# Patient Record
Sex: FEMALE | Race: WHITE | Employment: FULL TIME | URBAN - METROPOLITAN AREA
[De-identification: names, ages, dates, MRNs, and addresses within clinical notes are randomized per-mention and may not be internally consistent; named-entity substitution may affect disease eponyms.]

---

## 2017-09-18 ENCOUNTER — OFFICE VISIT (OUTPATIENT)
Dept: FAMILY MEDICINE CLINIC | Age: 42
End: 2017-09-18

## 2017-09-18 VITALS
TEMPERATURE: 98.5 F | OXYGEN SATURATION: 98 % | DIASTOLIC BLOOD PRESSURE: 70 MMHG | SYSTOLIC BLOOD PRESSURE: 115 MMHG | HEART RATE: 91 BPM

## 2017-09-18 DIAGNOSIS — R42 VERTIGO: Primary | ICD-10-CM

## 2017-09-18 PROCEDURE — 99214 OFFICE O/P EST MOD 30 MIN: CPT | Performed by: NURSE PRACTITIONER

## 2017-09-18 RX ORDER — MECLIZINE HYDROCHLORIDE 25 MG/1
50 TABLET ORAL 2 TIMES DAILY
Qty: 40 TABLET | Refills: 0 | Status: SHIPPED | OUTPATIENT
Start: 2017-09-18 | End: 2020-07-28

## 2017-09-18 RX ORDER — ONDANSETRON 4 MG/1
4 TABLET, ORALLY DISINTEGRATING ORAL EVERY 8 HOURS PRN
Qty: 20 TABLET | Refills: 0 | Status: SHIPPED | OUTPATIENT
Start: 2017-09-18 | End: 2020-07-28

## 2017-09-18 ASSESSMENT — ENCOUNTER SYMPTOMS
VOMITING: 1
SORE THROAT: 0
NAUSEA: 1
RESPIRATORY NEGATIVE: 1

## 2017-09-19 ENCOUNTER — HOSPITAL ENCOUNTER (OUTPATIENT)
Dept: PHYSICAL THERAPY | Age: 42
Discharge: HOME | End: 2017-09-30
Admitting: NURSE PRACTITIONER

## 2017-09-19 NOTE — FLOWSHEET NOTE
Physical Therapy Daily Treatment Note    Date:  2017    Patient Name:  Benji Guerrero    :  1975  MRN: [id number]  Restrictions/Precautions:    Medical/Treatment Diagnosis Information:  · Diagnosis: vertigo  Insurance/Certification information:  PT Insurance Information: [facility]  Physician Information:  Referring Practitioner: Álvaro Arroyo NP  Plan of care signed (Y/N):  N  Visit# / total visits:       G-Code (if applicable):         PT G-Codes  Functional Assessment Tool Used: DHI  Score: 84 = severe handicap  Functional Limitation: Mobility: Walking and moving around  Mobility: Walking and Moving Around Current Status (): At least 80 percent but less than 100 percent impaired, limited or restricted  Mobility: Walking and Moving Around Goal Status ():  At least 1 percent but less than 20 percent impaired, limited or restricted    Medicare Cap (if applicable):  n/a = total amount used, updated 2017    Time in:   9:30      Timed Treatment: 30 Total Treatment Time:  60  ________________________________________________________________________________________    Pain Level:    /10  SUBJECTIVE:  See initial eval    OBJECTIVE:     Exercise/Equipment Resistance/Repetitions Other comments          VOR x1 viewing - close object   Horizontal x60\"  Vertical x60\" HEP   VOR x1 viewing - far object Horizontal x60\"  Vertical x60\" HEP   COR Horizontal x60\"  Vertical x60\" HEP   Remembered targets Horizontal x60\"  Vertical x60\" HEP                                                                                                          Other Therapeutic Activities:  Education regarding vestibular neuritis/labyrinthitis, signs, symptoms, prognosis, duration and type of treatment; total neuro re-ed    Manual Treatments:         Modalities:      Test/Measurements:  See initial eval       ASSESSMENT:    See initial eval     Treatment/Activity Tolerance:   [x] Patient tolerated treatment well [] Patient

## 2017-09-19 NOTE — PROGRESS NOTES
Physical Therapy Vestibular Rehabilitation Evaluation    Date:  2017    Patient Name:  Robinson Alvarado    :  1975  MRN: [id number]  Restrictions/Precautions:    Medical/Treatment Diagnosis Information:  Diagnosis: vertigo  Insurance/Certification information:  PT Insurance Information: [facility]  Physician Information:  Referring Practitioner: Yamilka Fischer NP    Time in:   9:30      Timed Treatment: 30 Total Treatment Time:  60  ______________________________________________________________________    SUBJECTIVE:      Referral Date: 17  Onset Date: 9/15/17  Chief Complaint: constant vertigo, room spinning, worse with body motion and stimulating environments causing extreme fatigue and off balance symptoms. Currently unable to drive, walk in [address] [facility], complete household tasks for family (3 children ages 5, 6, 15), work. Setting in which symptoms first occurred: sudden onset Friday evening, room spinning nausea and constant vomiting for approximately 36 hours. Spinning continues but nausea has lessened and vomiting has stopped. Description of symptoms: [x] Veritgo []  Off-balance [] Lightheadedness  Symptoms are getting:  [x] Better [] Worse  [] Same [] Episodic  Description of Spells:  [x] Constant [x] Spontaneous [] Induced by motion [] Induced by position changes [] Other:  Length of time spells occur: [] Seconds [] Minutes  [] Hours [x] Days [] Other:   What increases symptoms? See above  What decreases symptoms?  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

## 2017-11-01 ENCOUNTER — HOSPITAL ENCOUNTER (OUTPATIENT)
Dept: OTHER | Age: 42
Discharge: HOME | End: 2017-11-16
Attending: NURSE PRACTITIONER | Admitting: NURSE PRACTITIONER

## 2020-07-15 ENCOUNTER — OFFICE VISIT (OUTPATIENT)
Dept: PRIMARY CARE CLINIC | Age: 45
End: 2020-07-15
Payer: COMMERCIAL

## 2020-07-15 PROCEDURE — 99211 OFF/OP EST MAY X REQ PHY/QHP: CPT | Performed by: NURSE PRACTITIONER

## 2020-07-15 PROCEDURE — G8421 BMI NOT CALCULATED: HCPCS | Performed by: NURSE PRACTITIONER

## 2020-07-15 PROCEDURE — G8428 CUR MEDS NOT DOCUMENT: HCPCS | Performed by: NURSE PRACTITIONER

## 2020-07-15 NOTE — PROGRESS NOTES
Logan Fair received a viral test for COVID-19. They were educated on isolation and quarantine as appropriate. For any symptoms, they were directed to seek care from their PCP, given contact information to establish with a doctor, directed to an urgent care or the emergency room.

## 2020-07-21 LAB
SARS-COV-2: NOT DETECTED
SOURCE: NORMAL

## 2020-07-21 NOTE — RESULT ENCOUNTER NOTE
Your test for COVID-19, also known as novel coronavirus, came back negative. No virus was detected from the sample collected. Testing is not 100%. Until your symptoms are fully resolved, you may still be contagious. We recommend that you remain isolated for 7 days minimum or 72 hours after your symptoms have completely resolved, whichever is longer. If you were exposed to a known positive COVID-19 patient, then you must remain isolated for 14 days. If you were tested for a pre-op, then you remain in isolated until your procedure. Continually monitor symptoms. Contact a medical provider if symptoms are worsening. If you have any additional questions, contact your PCP.     For additional information, please visit the Centers for Disease Control and Prevention [email or web address]
abscess

## 2020-07-27 ENCOUNTER — NURSE TRIAGE (OUTPATIENT)
Dept: OTHER | Facility: CLINIC | Age: 45
End: 2020-07-27

## 2020-07-28 ENCOUNTER — TELEMEDICINE (OUTPATIENT)
Dept: INTERNAL MEDICINE CLINIC | Age: 45
End: 2020-07-28
Payer: COMMERCIAL

## 2020-07-28 ENCOUNTER — TELEPHONE (OUTPATIENT)
Dept: INTERNAL MEDICINE CLINIC | Age: 45
End: 2020-07-28

## 2020-07-28 PROCEDURE — G8427 DOCREV CUR MEDS BY ELIG CLIN: HCPCS | Performed by: INTERNAL MEDICINE

## 2020-07-28 PROCEDURE — 99386 PREV VISIT NEW AGE 40-64: CPT | Performed by: INTERNAL MEDICINE

## 2020-07-28 PROCEDURE — 1036F TOBACCO NON-USER: CPT | Performed by: INTERNAL MEDICINE

## 2020-07-28 PROCEDURE — G8421 BMI NOT CALCULATED: HCPCS | Performed by: INTERNAL MEDICINE

## 2020-07-28 PROCEDURE — 99203 OFFICE O/P NEW LOW 30 MIN: CPT | Performed by: INTERNAL MEDICINE

## 2020-07-28 RX ORDER — FEXOFENADINE HCL 180 MG/1
180 TABLET ORAL DAILY
COMMUNITY

## 2020-07-28 RX ORDER — BENZONATATE 100 MG/1
100 CAPSULE ORAL 3 TIMES DAILY PRN
COMMUNITY

## 2020-07-28 RX ORDER — IBUPROFEN 200 MG
200 TABLET ORAL EVERY 6 HOURS PRN
COMMUNITY

## 2020-07-28 RX ORDER — AZITHROMYCIN 250 MG/1
250 TABLET, FILM COATED ORAL SEE ADMIN INSTRUCTIONS
Qty: 6 TABLET | Refills: 0 | Status: SHIPPED | OUTPATIENT
Start: 2020-07-28 | End: 2020-08-02

## 2020-07-28 ASSESSMENT — PATIENT HEALTH QUESTIONNAIRE - PHQ9
SUM OF ALL RESPONSES TO PHQ9 QUESTIONS 1 & 2: 0
SUM OF ALL RESPONSES TO PHQ QUESTIONS 1-9: 0
SUM OF ALL RESPONSES TO PHQ QUESTIONS 1-9: 0
1. LITTLE INTEREST OR PLEASURE IN DOING THINGS: 0
2. FEELING DOWN, DEPRESSED OR HOPELESS: 0

## 2020-07-28 NOTE — TELEPHONE ENCOUNTER
----- Message from Susan Medrano sent at 7/27/2020  5:38 PM EDT -----  Subject: Message to Provider    QUESTIONS  Information for Provider? NP appointment 8/3 was triaged today and   recommended she be seen within next 2-3 days   sinus pressure   congestion for 2 weeks   pt would like a call back   patient is [facility]) employee. ---------------------------------------------------------------------------  --------------  [place] INFO  What is the best way for the office to contact you? OK to leave message on   voicemail  Preferred Call Back Phone Number? [phone]  ---------------------------------------------------------------------------  --------------  SCRIPT ANSWERS  Relationship to Patient?  Self

## 2020-07-28 NOTE — PROGRESS NOTES
Not on file   Social History Narrative    Not on file       Review of Systems:  A comprehensive review of systems was negative except for what was noted in the HPI. Physical Exam:   There were no vitals filed for this visit. There is no height or weight on file to calculate BMI. Constitutional: She is oriented to person, place, and time. She appears well-developed and well-nourished. No distress. HEENT:   Head: Normocephalic and atraumatic. Right Ear: Tympanic membrane, external ear and ear canal normal.   Left Ear: Tympanic membrane, external ear and ear canal normal.   Mouth/Throat: Oropharynx is clear and moist, and mucous membranes are normal.  There is no cervical adenopathy. Eyes: Conjunctivae and extraocular motions are normal. Pupils are equal, round, and reactive to light. Neck: Supple. No JVD present. Carotid bruit is not present. No mass and no thyromegaly present. Cardiovascular: Normal rate, regular rhythm, normal heart sounds and intact distal pulses. Exam reveals no gallop and no friction rub. No murmur heard. Pulmonary/Chest: Effort normal and breath sounds normal. No respiratory distress. She has no wheezes, rhonchi or rales. Abdominal: Soft, non-tender. Bowel sounds and aorta are normal. She exhibits no organomegaly, mass or bruit. Musculoskeletal: Normal range of motion, no synovitis. She exhibits no edema. Neurological: She is alert and oriented to person, place, and time. She has normal reflexes. No cranial nerve deficit. Coordination normal.   Skin: Skin is warm and dry. There is no rash or erythema. No suspicious lesions noted. Psychiatric: She has a normal mood and affect.  Her speech is normal and behavior is normal. Judgment, cognition and memory are normal.       Preventive Care:  Health Maintenance   Topic Date Due    HIV screen  12/30/1991    DTaP/Tdap/Td vaccine (1 - Tdap) 12/30/1995    Cervical cancer screen  12/30/1997    Lipid screen  12/30/2016    Future    Need for tetanus, diphtheria, and acellular pertussis (Tdap) vaccine in patient of adolescent age or older  -     Tdap (age 6y and older) IM ([address]); Future    Sinusitis, bacterial  -     azithromycin (ZITHROMAX) 250 MG tablet; Take 1 tablet by mouth See Admin Instructions for 5 days 500mg on day 1 followed by 250mg on days 2 - 5        Return July 28 at 7:30 AM Fasting Physical, for  Tdap and labs next Monday 8/3 at 7:45.

## 2020-07-28 NOTE — PATIENT INSTRUCTIONS
Preventive plan of care for Elie Rogers        7/28/2020           Preventive Measures Status       Recommendations for screening                   Diabetes Screen  No results found for: GLUCOSE Test recommended and ordered   Cholesterol Screen  No results found for: CHOL, TRIG, HDL, LDLCALC, LDLDIRECT Test recommended and ordered       Weight: There is no height or weight on file to calculate BMI.                 Recommended Immunizations    Immunization History   Administered Date(s) Administered    Influenza Virus Vaccine 10/24/2019        Influenza vaccine:  recommended every fall  Tetanus vaccine:  tetanus and diptheria vaccine (Td) due now, but will be administered MOnday         Other Recommendations ·   See a dentist every 6 months  · Try to get at least 30 minutes of exercise 3-5 days per week  · Always wear a seat belt when traveling in a car  · Always wear a helmet when riding a bicycle or motorcycle  · When exposed to the sun, use a sunscreen that protects against both UVA and UVB radiation with an SPF of 30 or greater- reapply every 2 to 3 hours or after sweating, drying off with a towel, or swimming  · You need 500 mg of calcium and [id number] IU of vitamin D per day- it is possible to meet your calcium requirement with diet alone, but a vitamin D supplement is usually necessary

## 2020-07-28 NOTE — TELEPHONE ENCOUNTER
LVM for patient to return call to the office for scheduling. Can schedule sooner for VV to establish as a new patient IF she has new problem visit I.e. sinus congestion 2 weeks. Asked to call back if having current sinus congestion problems that need treated to do VV sooner than 8/3    Insurance should cover VV for a problem visit, however not all insurance will cover a NEW Patient Virtual Visit if healthy & essentially need a physical, in that case we recommend waiting until can be seen in office.

## 2020-08-03 ENCOUNTER — NURSE ONLY (OUTPATIENT)
Dept: INTERNAL MEDICINE CLINIC | Age: 45
End: 2020-08-03
Payer: COMMERCIAL

## 2020-08-03 LAB
A/G RATIO: 1.5 (ref 1.1–2.2)
ALBUMIN SERPL-MCNC: 4.4 G/DL (ref 3.4–5)
ALP BLD-CCNC: 87 U/L (ref 40–129)
ALT SERPL-CCNC: 22 U/L (ref 10–40)
ANION GAP SERPL CALCULATED.3IONS-SCNC: 14 MMOL/L (ref 3–16)
AST SERPL-CCNC: 16 U/L (ref 15–37)
BASOPHILS ABSOLUTE: 0.1 K/UL (ref 0–0.2)
BASOPHILS RELATIVE PERCENT: 0.7 %
BILIRUB SERPL-MCNC: 0.6 MG/DL (ref 0–1)
BUN BLDV-MCNC: 10 MG/DL (ref 7–20)
CALCIUM SERPL-MCNC: 9.6 MG/DL (ref 8.3–10.6)
CHLORIDE BLD-SCNC: 102 MMOL/L (ref 99–110)
CHOLESTEROL, TOTAL: 173 MG/DL (ref 0–199)
CO2: 24 MMOL/L (ref 21–32)
CREAT SERPL-MCNC: 0.8 MG/DL (ref 0.6–1.1)
EOSINOPHILS ABSOLUTE: 0.3 K/UL (ref 0–0.6)
EOSINOPHILS RELATIVE PERCENT: 3.5 %
GFR AFRICAN AMERICAN: >60
GFR NON-AFRICAN AMERICAN: >60
GLOBULIN: 2.9 G/DL
GLUCOSE BLD-MCNC: 99 MG/DL (ref 70–99)
HCT VFR BLD CALC: 42.1 % (ref 36–48)
HDLC SERPL-MCNC: 43 MG/DL (ref 40–60)
HEMOGLOBIN: 13.9 G/DL (ref 12–16)
LDL CHOLESTEROL CALCULATED: 105 MG/DL
LYMPHOCYTES ABSOLUTE: 1.7 K/UL (ref 1–5.1)
LYMPHOCYTES RELATIVE PERCENT: 20.1 %
MCH RBC QN AUTO: 29.7 PG (ref 26–34)
MCHC RBC AUTO-ENTMCNC: 33 G/DL (ref 31–36)
MCV RBC AUTO: 90 FL (ref 80–100)
MONOCYTES ABSOLUTE: 0.5 K/UL (ref 0–1.3)
MONOCYTES RELATIVE PERCENT: 6.1 %
NEUTROPHILS ABSOLUTE: 5.9 K/UL (ref 1.7–7.7)
NEUTROPHILS RELATIVE PERCENT: 69.6 %
PDW BLD-RTO: 13.8 % (ref 12.4–15.4)
PLATELET # BLD: 320 K/UL (ref 135–450)
PMV BLD AUTO: 7.8 FL (ref 5–10.5)
POTASSIUM SERPL-SCNC: 4.6 MMOL/L (ref 3.5–5.1)
RBC # BLD: 4.68 M/UL (ref 4–5.2)
SODIUM BLD-SCNC: 140 MMOL/L (ref 136–145)
TOTAL PROTEIN: 7.3 G/DL (ref 6.4–8.2)
TRIGL SERPL-MCNC: 125 MG/DL (ref 0–150)
VLDLC SERPL CALC-MCNC: 25 MG/DL
WBC # BLD: 8.4 K/UL (ref 4–11)

## 2020-08-03 PROCEDURE — 90715 TDAP VACCINE 7 YRS/> IM: CPT | Performed by: INTERNAL MEDICINE

## 2020-08-03 PROCEDURE — 90471 IMMUNIZATION ADMIN: CPT | Performed by: INTERNAL MEDICINE

## 2021-04-06 ENCOUNTER — HOSPITAL ENCOUNTER (OUTPATIENT)
Dept: WOMENS IMAGING | Age: 46
Discharge: HOME | End: 2021-04-06
Payer: COMMERCIAL

## 2021-04-06 DIAGNOSIS — Z12.31 ENCOUNTER FOR SCREENING MAMMOGRAM FOR MALIGNANT NEOPLASM OF BREAST: ICD-10-CM

## 2021-04-06 PROCEDURE — 77067 SCR MAMMO BI INCL CAD: CPT

## 2021-04-07 ENCOUNTER — TELEPHONE (OUTPATIENT)
Dept: WOMENS IMAGING | Age: 46
End: 2021-04-07

## 2022-05-29 ENCOUNTER — TELEMEDICINE (OUTPATIENT)
Dept: INTERNAL MEDICINE CLINIC | Age: 47
End: 2022-05-29
Payer: COMMERCIAL
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
## 2022-05-29 DIAGNOSIS — S00.06XA TICK BITE OF SCALP, INITIAL ENCOUNTER: Primary | ICD-10-CM

## 2022-05-29 DIAGNOSIS — Z00.00 ANNUAL PHYSICAL EXAM: ICD-10-CM

## 2022-05-29 DIAGNOSIS — R53.83 OTHER FATIGUE: ICD-10-CM

## 2022-05-29 DIAGNOSIS — M25.50 POLYARTHRALGIA: ICD-10-CM

## 2022-05-29 PROCEDURE — G8427 DOCREV CUR MEDS BY ELIG CLIN: HCPCS | Performed by: INTERNAL MEDICINE

## 2022-05-29 PROCEDURE — 99213 OFFICE O/P EST LOW 20 MIN: CPT | Performed by: INTERNAL MEDICINE

## 2022-05-31 ENCOUNTER — HOSPITAL ENCOUNTER (OUTPATIENT)
Age: 47
Discharge: HOME | End: 2022-05-31
Payer: COMMERCIAL

## 2022-05-31 DIAGNOSIS — S00.06XA TICK BITE OF SCALP, INITIAL ENCOUNTER: ICD-10-CM

## 2022-05-31 DIAGNOSIS — R53.83 OTHER FATIGUE: ICD-10-CM

## 2022-05-31 DIAGNOSIS — M25.50 POLYARTHRALGIA: ICD-10-CM

## 2022-05-31 DIAGNOSIS — Z00.00 ANNUAL PHYSICAL EXAM: ICD-10-CM
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
## 2022-05-31 LAB
A/G RATIO: 1.9 (ref 1.1–2.2)
ALBUMIN SERPL-MCNC: 4.7 G/DL (ref 3.4–5)
ALP BLD-CCNC: 89 U/L (ref 40–129)
ALT SERPL-CCNC: 25 U/L (ref 10–40)
ANION GAP SERPL CALCULATED.3IONS-SCNC: 15 MMOL/L (ref 3–16)
AST SERPL-CCNC: 19 U/L (ref 15–37)
BASOPHILS ABSOLUTE: 0.1 K/UL (ref 0–0.2)
BASOPHILS RELATIVE PERCENT: 1 %
BILIRUB SERPL-MCNC: 0.5 MG/DL (ref 0–1)
BUN BLDV-MCNC: 12 MG/DL (ref 7–20)
CALCIUM SERPL-MCNC: 9.6 MG/DL (ref 8.3–10.6)
CHLORIDE BLD-SCNC: 103 MMOL/L (ref 99–110)
CHOLESTEROL, TOTAL: 156 MG/DL (ref 0–199)
CO2: 21 MMOL/L (ref 21–32)
CREAT SERPL-MCNC: 0.7 MG/DL (ref 0.6–1.1)
EOSINOPHILS ABSOLUTE: 0.2 K/UL (ref 0–0.6)
EOSINOPHILS RELATIVE PERCENT: 2.6 %
GFR AFRICAN AMERICAN: >60
GFR NON-AFRICAN AMERICAN: >60
GLUCOSE BLD-MCNC: 78 MG/DL (ref 70–99)
HCT VFR BLD CALC: 40.8 % (ref 36–48)
HDLC SERPL-MCNC: 48 MG/DL (ref 40–60)
HEMOGLOBIN: 13.8 G/DL (ref 12–16)
LDL CHOLESTEROL CALCULATED: 88 MG/DL
LYMPHOCYTES ABSOLUTE: 1.6 K/UL (ref 1–5.1)
LYMPHOCYTES RELATIVE PERCENT: 20 %
MCH RBC QN AUTO: 29.6 PG (ref 26–34)
MCHC RBC AUTO-ENTMCNC: 33.9 G/DL (ref 31–36)
MCV RBC AUTO: 87.3 FL (ref 80–100)
MONOCYTES ABSOLUTE: 0.4 K/UL (ref 0–1.3)
MONOCYTES RELATIVE PERCENT: 5.6 %
NEUTROPHILS ABSOLUTE: 5.6 K/UL (ref 1.7–7.7)
NEUTROPHILS RELATIVE PERCENT: 70.8 %
PDW BLD-RTO: 13.7 % (ref 12.4–15.4)
PLATELET # BLD: 330 K/UL (ref 135–450)
PMV BLD AUTO: 7.6 FL (ref 5–10.5)
POTASSIUM SERPL-SCNC: 4.6 MMOL/L (ref 3.5–5.1)
RBC # BLD: 4.68 M/UL (ref 4–5.2)
SODIUM BLD-SCNC: 139 MMOL/L (ref 136–145)
TOTAL PROTEIN: 7.2 G/DL (ref 6.4–8.2)
TRIGL SERPL-MCNC: 100 MG/DL (ref 0–150)
VLDLC SERPL CALC-MCNC: 20 MG/DL
WBC # BLD: 7.9 K/UL (ref 4–11)

## 2022-05-31 PROCEDURE — 80053 COMPREHEN METABOLIC PANEL: CPT

## 2022-05-31 PROCEDURE — 85025 COMPLETE CBC W/AUTO DIFF WBC: CPT

## 2022-05-31 PROCEDURE — 80061 LIPID PANEL: CPT

## 2022-05-31 PROCEDURE — 86618 LYME DISEASE ANTIBODY: CPT

## 2022-05-31 PROCEDURE — 36415 COLL VENOUS BLD VENIPUNCTURE: CPT

## 2022-05-31 PROCEDURE — 86803 HEPATITIS C AB TEST: CPT

## 2022-06-01 ENCOUNTER — HOSPITAL ENCOUNTER (OUTPATIENT)
Dept: WOMENS IMAGING | Age: 47
Discharge: HOME | End: 2022-06-01
Payer: COMMERCIAL

## 2022-06-01 ENCOUNTER — OFFICE VISIT (OUTPATIENT)
Dept: INTERNAL MEDICINE CLINIC | Age: 47
End: 2022-06-01
Payer: COMMERCIAL

## 2022-06-01 VITALS
BODY MASS INDEX: 44.2 KG/M2 | DIASTOLIC BLOOD PRESSURE: 68 MMHG | OXYGEN SATURATION: 97 % | HEART RATE: 84 BPM | HEIGHT: 66 IN | SYSTOLIC BLOOD PRESSURE: 110 MMHG | WEIGHT: 275 LBS

## 2022-06-01 DIAGNOSIS — Z12.31 ENCOUNTER FOR SCREENING MAMMOGRAM FOR BREAST CANCER: ICD-10-CM

## 2022-06-01 DIAGNOSIS — Z12.11 SCREENING FOR COLORECTAL CANCER: ICD-10-CM

## 2022-06-01 DIAGNOSIS — Z11.59 ENCOUNTER FOR HEPATITIS C SCREENING TEST FOR LOW RISK PATIENT: ICD-10-CM

## 2022-06-01 DIAGNOSIS — E66.01 MORBID OBESITY WITH BMI OF 40.0-44.9, ADULT (HCC): ICD-10-CM

## 2022-06-01 DIAGNOSIS — S30.860A TICK BITE OF BUTTOCK, INITIAL ENCOUNTER: ICD-10-CM

## 2022-06-01 DIAGNOSIS — Z12.12 SCREENING FOR COLORECTAL CANCER: ICD-10-CM

## 2022-06-01 DIAGNOSIS — Z00.00 ENCOUNTER FOR WELL ADULT EXAM WITHOUT ABNORMAL FINDINGS: Primary | ICD-10-CM
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
## 2022-06-01 LAB
HEPATITIS C ANTIBODY INTERPRETATION: NORMAL
LYME, EIA: 0.33 LIV (ref 0–1.2)

## 2022-06-01 PROCEDURE — 99396 PREV VISIT EST AGE 40-64: CPT | Performed by: INTERNAL MEDICINE

## 2022-06-01 PROCEDURE — 77063 BREAST TOMOSYNTHESIS BI: CPT

## 2022-06-01 RX ORDER — DOXYCYCLINE HYCLATE 100 MG
200 TABLET ORAL DAILY
Qty: 2 TABLET | Refills: 0 | Status: SHIPPED | OUTPATIENT
Start: 2022-06-01 | End: 2022-06-02

## 2022-06-01 SDOH — ECONOMIC STABILITY: FOOD INSECURITY: WITHIN THE PAST 12 MONTHS, THE FOOD YOU BOUGHT JUST DIDN'T LAST AND YOU DIDN'T HAVE MONEY TO GET MORE.: NEVER TRUE

## 2022-06-01 SDOH — ECONOMIC STABILITY: FOOD INSECURITY: WITHIN THE PAST 12 MONTHS, YOU WORRIED THAT YOUR FOOD WOULD RUN OUT BEFORE YOU GOT MONEY TO BUY MORE.: NEVER TRUE

## 2022-06-01 ASSESSMENT — PATIENT HEALTH QUESTIONNAIRE - PHQ9
SUM OF ALL RESPONSES TO PHQ QUESTIONS 1-9: 0
2. FEELING DOWN, DEPRESSED OR HOPELESS: 0
SUM OF ALL RESPONSES TO PHQ QUESTIONS 1-9: 0
SUM OF ALL RESPONSES TO PHQ QUESTIONS 1-9: 0
SUM OF ALL RESPONSES TO PHQ9 QUESTIONS 1 & 2: 0
SUM OF ALL RESPONSES TO PHQ QUESTIONS 1-9: 0
1. LITTLE INTEREST OR PLEASURE IN DOING THINGS: 0

## 2022-06-01 ASSESSMENT — SOCIAL DETERMINANTS OF HEALTH (SDOH): HOW HARD IS IT FOR YOU TO PAY FOR THE VERY BASICS LIKE FOOD, HOUSING, MEDICAL CARE, AND HEATING?: NOT HARD AT ALL

## 2022-06-01 NOTE — PATIENT INSTRUCTIONS

## 2022-06-01 NOTE — PROGRESS NOTES
[facility]  History and Physical  Joshua Graham M.D. Karmen Burkitt  YOB: 1975    Date of Service:  6/1/2022    Chief Complaint:   Karmen Burkitt is a 40 y.o. female who presents for   Chief Complaint   Patient presents with    Annual Exam       Assessment/Plan:    Daisha Honeycutt was seen today for annual exam.    Diagnoses and all orders for this visit:    Encounter for well adult exam without abnormal findings    Screening for colorectal cancer  -     FIT-DNA (Cologuard)    Encounter for hepatitis C screening test for low risk patient  -     Hepatitis C Antibody; Future    Tick bite of buttock, initial encounter  -     doxycycline hyclate (VIBRA-TABS) 100 MG tablet; Take 2 tablets by mouth daily for 1 day    Morbid obesity with BMI of 40.0-44.9, adult (HCC)    Goals:   -Eat small amounts of food 4-5 times daily  -Avoid high fat and high sugar foods  -Include protein with all meals and snacks  -Avoid carbonation and caffeine  -Avoid calorie containing beverages  -Increase physical activity as tolerated    Return June 7 2023 at 8:10 Fasting Physical in 1 year. HPI: Here for Annual Physical and Follow up. She removed another tick yesterday from her left buttocks area. No rash. No joint pain. Just found out her daughter tested positive for Covid 19. Her joint pain has improved so she could have had Covid 19 a few weeks ago.     No results found for: LABA1C, LABMICR  Lab Results   Component Value Date     05/31/2022    K 4.6 05/31/2022     05/31/2022    CO2 21 05/31/2022    BUN 12 05/31/2022    CREATININE 0.7 05/31/2022    GLUCOSE 78 05/31/2022    CALCIUM 9.6 05/31/2022     Lab Results   Component Value Date    CHOL 156 05/31/2022    TRIG 100 05/31/2022    HDL 48 05/31/2022    LDLCALC 88 05/31/2022     Lab Results   Component Value Date    ALT 25 05/31/2022    AST 19 05/31/2022     No results found for: TSH, T4FREE, T3FREE  Lab Results   Component Value Date    WBC 7.9 05/31/2022    HGB 13.8 05/31/2022    HCT 40.8 05/31/2022    MCV 87.3 05/31/2022     05/31/2022     No results found for: INR   No results found for: PSA   No results found for: LABURIC     Wt Readings from Last 3 Encounters:   06/01/22 275 lb (124.7 kg)   11/25/13 178 lb (80.7 kg)   08/21/13 199 lb (90.3 kg)     BP Readings from Last 3 Encounters:   06/01/22 110/68   09/18/17 115/70   11/25/13 111/71       Patient Active Problem List   Diagnosis    Scoliosis       Allergies   Allergen Reactions    Vicodin Hp [Hydrocodone-Acetaminophen] Rash     Outpatient Medications Marked as Taking for the 6/1/22 encounter (Office Visit) with Bonnie Adame MD   Medication Sig Dispense Refill    doxycycline hyclate (VIBRA-TABS) 100 MG tablet Take 2 tablets by mouth daily for 1 day 2 tablet 0       Past Medical History:   Diagnosis Date    Scoliosis      Past Surgical History:   Procedure Laterality Date    APPENDECTOMY      age 25   [address]    Dr. Gilmar Mckeon, chronic strep infx, cervical lad    TONSILLECTOMY  12/14/10     Family History   Problem Relation Age of Onset    Heart Disease Maternal Grandfather         CHF    Atrial Fibrillation Maternal Grandfather     High Blood Pressure Father     High Cholesterol Father     High Cholesterol Mother     Atrial Fibrillation Maternal Grandmother     Parkinsonism Maternal Grandmother     High Cholesterol Paternal Grandmother     Breast Cancer Paternal Grandmother 61    Alcohol Abuse Paternal Grandfather     Cirrhosis Paternal Grandfather      Social History     Socioeconomic History    Marital status:      Spouse name: Not on file    Number of children: Not on file    Years of education: Not on file    Highest education level: Not on file   Occupational History    Not on file   Tobacco Use    Smoking status: Never Smoker    Smokeless tobacco: Never Used   Vaping Use    Vaping Use: Never used   Substance and Sexual Activity    Alcohol use: No    Drug use: No    Sexual activity: Yes   Other Topics Concern    Not on file   Social History Narrative    Not on file     Social Determinants of Health     Financial Resource Strain: Low Risk     Difficulty of Paying Living Expenses: Not hard at all   Food Insecurity: No Food Insecurity    Worried About Running Out of Food in the Last Year: Never true    Mirna of Food in the Last Year: Never true   Transportation Needs:     Lack of Transportation (Medical): Not on file    Lack of Transportation (Non-Medical): Not on file   Physical Activity:     Days of Exercise per Week: Not on file    Minutes of Exercise per Session: Not on file   Stress:     Feeling of Stress : Not on file   Social Connections:     Frequency of Communication with Friends and Family: Not on file    Frequency of Social Gatherings with Friends and Family: Not on file    Attends Scientologist Services: Not on file    Active Member of [address] [place] [address] or Organizations: Not on file    Attends Club or Organization Meetings: Not on file    Marital Status: Not on file   Intimate Partner Violence:     Fear of Current or Ex-Partner: Not on file    Emotionally Abused: Not on file    Physically Abused: Not on file    Sexually Abused: Not on file   Housing Stability:     Unable to Pay for Housing in the Last Year: Not on file    Number of [place] in the Last Year: Not on file    Unstable Housing in the Last Year: Not on file       Review of Systems:  A comprehensive review of systems was negative except for what was noted in the HPI. Physical Exam:   Vitals:    06/01/22 1502   BP: 110/68   Pulse: 84   SpO2: 97%   Weight: 275 lb (124.7 kg)   Height: 5' 6\" (1.676 m)     Body mass index is 44.39 kg/m². Constitutional: She is oriented to person, place, and time. She appears well-developed and well-nourished. No distress. HEENT:   Head: Normocephalic and atraumatic.    Right Ear: Tympanic membrane, external ear

## 2022-07-26 ENCOUNTER — TELEMEDICINE (OUTPATIENT)
Dept: INTERNAL MEDICINE CLINIC | Age: 47
End: 2022-07-26
Payer: COMMERCIAL

## 2022-07-26 DIAGNOSIS — R05.8 POST-VIRAL COUGH SYNDROME: Primary | ICD-10-CM

## 2022-07-26 PROCEDURE — 99213 OFFICE O/P EST LOW 20 MIN: CPT | Performed by: INTERNAL MEDICINE

## 2022-07-26 PROCEDURE — G8427 DOCREV CUR MEDS BY ELIG CLIN: HCPCS | Performed by: INTERNAL MEDICINE

## 2022-07-26 RX ORDER — FLUTICASONE FUROATE, UMECLIDINIUM BROMIDE AND VILANTEROL TRIFENATATE 100; 62.5; 25 UG/1; UG/1; UG/1
1 POWDER RESPIRATORY (INHALATION) DAILY
Qty: 1 EACH | Refills: 0 | Status: SHIPPED | OUTPATIENT
Start: 2022-07-26

## 2022-07-26 RX ORDER — ALBUTEROL SULFATE 90 UG/1
2 AEROSOL, METERED RESPIRATORY (INHALATION) 4 TIMES DAILY PRN
Qty: 18 G | Refills: 0 | Status: SHIPPED | OUTPATIENT
Start: 2022-07-26 | End: 2022-09-29 | Stop reason: SDUPTHER

## 2022-07-26 NOTE — PROGRESS NOTES
Pursuant to the emergency declaration under the [facility] authority and the [employer] Act, this Virtual  Video Visit was conducted, with patient's consent, to reduce the patient's risk of exposure to COVID-19 and provide continuity of care. Service is  provided through a video synchronous discussion virtually to substitute for in-person clinic visit with the patient being at home and Dr. Stephanie Blank being at home. Patient consent to the video visit. Date of Service:  7/26/2022    Chief Complaint:      Chief Complaint   Patient presents with    Cough     Persistent cough for 4 weeks, productive first week only Symptoms changed into just a cough that won't go away. No other symptoms, just feeling run down from cough       Assessment/Plan:    Chicho Taylor was seen today for cough. Diagnoses and all orders for this visit:    Post-viral cough syndrome  -     albuterol sulfate HFA (VENTOLIN HFA) 108 (90 Base) MCG/ACT inhaler; Inhale 2 puffs into the lungs 4 times daily as needed for Wheezing  -     fluticasone-umeclidin-vilant (Thedora Hang) 100-62.5-25 MCG/INH AEPB; Inhale 1 puff into the lungs in the morning. Can give her a sample of Trelegy if too expensive    Return if symptoms worsen or fail to improve. HPI:  Carlitos Hernandez is a 40 y.o. She complain of being sick for 4 weeks with bad headache, drainage, chills, cough from clear to yellowish/green to clear again. Her home Covid 19 test was negative multiple times during the first week. 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 She's coughing so hard that she's having some urine incontinence and needing to wear a pad.     No results found for: LABA1C, LABMICR  Lab Results   Component Value Date     05/31/2022    K 4.6 05/31/2022  05/31/2022    CO2 21 05/31/2022    BUN 12 05/31/2022    CREATININE 0.7 05/31/2022    GLUCOSE 78 05/31/2022    CALCIUM 9.6 05/31/2022     Lab Results   Component Value Date/Time    CHOL 156 05/31/2022 11:23 AM    TRIG 100 05/31/2022 11:23 AM    HDL 48 05/31/2022 11:23 AM    LDLCALC 88 05/31/2022 11:23 AM     Lab Results   Component Value Date    ALT 25 05/31/2022    AST 19 05/31/2022     No results found for: TSH, T4FREE, T3FREE  Lab Results   Component Value Date    WBC 7.9 05/31/2022    HGB 13.8 05/31/2022    HCT 40.8 05/31/2022    MCV 87.3 05/31/2022     05/31/2022     No results found for: INR   No results found for: PSA   No results found for: [facility]     Patient Active Problem List   Diagnosis    Scoliosis       Allergies   Allergen Reactions    Vicodin Hp [Hydrocodone-Acetaminophen] Rash     Outpatient Medications Marked as Taking for the 7/26/22 encounter (Telemedicine) with Sagrario Adame MD   Medication Sig Dispense Refill    dextromethorphan-guaiFENesin (MUCINEX DM)  MG per extended release tablet Take 1 tablet by mouth every 12 hours as needed      ibuprofen (ADVIL;MOTRIN) 200 MG tablet Take 200 mg by mouth every 6 hours as needed for Pain           Review of Systems: 14 systems were negative except of what was stated on HPI    Nursing note and vitals reviewed. There were no vitals filed for this visit. Wt Readings from Last 3 Encounters:   06/01/22 275 lb (124.7 kg)   11/25/13 178 lb (80.7 kg)   08/21/13 199 lb (90.3 kg)     BP Readings from Last 3 Encounters:   06/01/22 110/68   09/18/17 115/70   11/25/13 111/71     There is no height or weight on file to calculate BMI. Constitutional: Patient appears well-developed and well-nourished. No distress. Head: Normocephalic and atraumatic. Psychiatric: Normal mood and affect.  Behavior is normal.

## 2022-08-01 DIAGNOSIS — R05.8 POST-VIRAL COUGH SYNDROME: Primary | ICD-10-CM

## 2022-08-01 RX ORDER — PREDNISONE 20 MG/1
TABLET ORAL
Qty: 10 TABLET | Refills: 0 | Status: SHIPPED | OUTPATIENT
Start: 2022-08-01 | End: 2022-08-11

## 2022-09-29 ENCOUNTER — TELEMEDICINE (OUTPATIENT)
Dept: INTERNAL MEDICINE CLINIC | Age: 47
End: 2022-09-29
Payer: COMMERCIAL

## 2022-09-29 DIAGNOSIS — U07.1 COVID-19 VIRUS INFECTION: Primary | ICD-10-CM

## 2022-09-29 PROCEDURE — G8427 DOCREV CUR MEDS BY ELIG CLIN: HCPCS | Performed by: INTERNAL MEDICINE

## 2022-09-29 PROCEDURE — 99213 OFFICE O/P EST LOW 20 MIN: CPT | Performed by: INTERNAL MEDICINE

## 2022-09-29 RX ORDER — ALBUTEROL SULFATE 90 UG/1
2 AEROSOL, METERED RESPIRATORY (INHALATION) 4 TIMES DAILY PRN
Qty: 18 G | Refills: 0 | Status: SHIPPED | OUTPATIENT
Start: 2022-09-29

## 2022-09-29 RX ORDER — NIRMATRELVIR AND RITONAVIR 150-100 MG
KIT ORAL
Qty: 20 TABLET | Refills: 0 | Status: SHIPPED | OUTPATIENT
Start: 2022-09-29 | End: 2022-10-04

## 2022-09-29 NOTE — PROGRESS NOTES
Pursuant to the emergency declaration under the [facility], [facility] waiver authority and the [employer] Act, this Virtual  Video Visit was conducted, with patient's consent, to reduce the patient's risk of exposure to COVID-19 and provide continuity of care. Service is  provided through a video synchronous discussion virtually to substitute for in-person clinic visit with the patient being at home and Dr. Gato Gomez being at home. Patient consent to the video visit. Date of Service:  9/29/2022    Chief Complaint:      Chief Complaint   Patient presents with    Positive For Covid-19       Assessment/Plan:    Rosalind Sher was seen today for positive for covid-19. Diagnoses and all orders for this visit:    COVID-19 virus infection  -     nirmatrelvir/ritonavir (PAXLOVID, 150/100,) 10 x 150 MG & 10 x 100MG TBPK; Take 2 tablets (one 150 mg nirmatrelvir and one 100 mg ritonavir tablets) by mouth every 12 hours for 5 days. -     albuterol sulfate HFA (VENTOLIN HFA) 108 (90 Base) MCG/ACT inhaler; Inhale 2 puffs into the lungs 4 times daily as needed for Wheezing      Return if symptoms worsen or fail to improve. HPI:  Melisa Ponce is a 40 y.o. She complain of a headache 2 days ago and then she started with a sore throat, myalgia, fever, chills, worsening cough and increasing headache yesterday. She did do a Covid 19 test that came back positive today with persistent fever or chills. Her  had similar symptoms 4 days ago and he tested negative. She does have some Trelegy at home since she stop it after her bronchospasm resolve last month. No shortness of breath or chest pain.     No results found for: LABA1C, LABMICR  Lab Results   Component Value Date     05/31/2022    K 4.6 05/31/2022     05/31/2022    CO2 21 05/31/2022    BUN 12 05/31/2022    CREATININE 0.7 05/31/2022    GLUCOSE 78 05/31/2022 CALCIUM 9.6 05/31/2022     Lab Results   Component Value Date/Time    CHOL 156 05/31/2022 11:23 AM    TRIG 100 05/31/2022 11:23 AM    HDL 48 05/31/2022 11:23 AM    LDLCALC 88 05/31/2022 11:23 AM     Lab Results   Component Value Date    ALT 25 05/31/2022    AST 19 05/31/2022     No results found for: TSH, T4FREE, T3FREE  Lab Results   Component Value Date    WBC 7.9 05/31/2022    HGB 13.8 05/31/2022    HCT 40.8 05/31/2022    MCV 87.3 05/31/2022     05/31/2022     No results found for: INR   No results found for: PSA   No results found for: [facility]     Patient Active Problem List   Diagnosis    Scoliosis       Allergies   Allergen Reactions    Vicodin Hp [Hydrocodone-Acetaminophen] Rash     Outpatient Medications Marked as Taking for the 9/29/22 encounter (Telemedicine) with Charly Adame MD   Medication Sig Dispense Refill    albuterol sulfate HFA (VENTOLIN HFA) 108 (90 Base) MCG/ACT inhaler Inhale 2 puffs into the lungs 4 times daily as needed for Wheezing 18 g 0    fluticasone-umeclidin-vilant (TRELEGY ELLIPTA) 100-62.5-25 MCG/INH AEPB Inhale 1 puff into the lungs in the morning. 1 each 0    dextromethorphan-guaiFENesin (MUCINEX DM)  MG per extended release tablet Take 1 tablet by mouth every 12 hours as needed      benzonatate (TESSALON) 100 MG capsule Take 100 mg by mouth 3 times daily as needed for Cough      ibuprofen (ADVIL;MOTRIN) 200 MG tablet Take 200 mg by mouth every 6 hours as needed for Pain      fexofenadine (ALLEGRA) 180 MG tablet Take 180 mg by mouth daily           Review of Systems: 14 systems were negative except of what was stated on HPI    Nursing note and vitals reviewed. There were no vitals filed for this visit.   Wt Readings from Last 3 Encounters:   06/01/22 275 lb (124.7 kg)   11/25/13 178 lb (80.7 kg)   08/21/13 199 lb (90.3 kg)     BP Readings from Last 3 Encounters:   06/01/22 110/68   09/18/17 115/70   11/25/13 111/71     There is no height or weight on file to calculate BMI.  Constitutional: Patient appears well-developed and well-nourished. No distress. Head: Normocephalic and atraumatic. Psychiatric: Normal mood and affect.  Behavior is normal.

## 2023-02-22 ENCOUNTER — OFFICE VISIT (OUTPATIENT)
Dept: INTERNAL MEDICINE CLINIC | Age: 48
End: 2023-02-22
Payer: COMMERCIAL

## 2023-02-22 VITALS
WEIGHT: 293 LBS | TEMPERATURE: 98.9 F | OXYGEN SATURATION: 97 % | HEIGHT: 66 IN | HEART RATE: 98 BPM | SYSTOLIC BLOOD PRESSURE: 110 MMHG | DIASTOLIC BLOOD PRESSURE: 72 MMHG | BODY MASS INDEX: 47.09 KG/M2

## 2023-02-22 DIAGNOSIS — L98.9 SKIN LESION OF NECK: Primary | ICD-10-CM

## 2023-02-22 DIAGNOSIS — L08.9 SKIN INFECTION: ICD-10-CM

## 2023-02-22 PROCEDURE — G8427 DOCREV CUR MEDS BY ELIG CLIN: HCPCS | Performed by: INTERNAL MEDICINE

## 2023-02-22 PROCEDURE — G8417 CALC BMI ABV UP PARAM F/U: HCPCS | Performed by: INTERNAL MEDICINE

## 2023-02-22 PROCEDURE — 99213 OFFICE O/P EST LOW 20 MIN: CPT | Performed by: INTERNAL MEDICINE

## 2023-02-22 PROCEDURE — G8484 FLU IMMUNIZE NO ADMIN: HCPCS | Performed by: INTERNAL MEDICINE

## 2023-02-22 PROCEDURE — 1036F TOBACCO NON-USER: CPT | Performed by: INTERNAL MEDICINE

## 2023-02-22 RX ORDER — CEPHALEXIN 500 MG/1
500 CAPSULE ORAL 3 TIMES DAILY
Qty: 30 CAPSULE | Refills: 0 | Status: SHIPPED | OUTPATIENT
Start: 2023-02-22

## 2023-02-22 SDOH — ECONOMIC STABILITY: HOUSING INSECURITY
IN THE LAST 12 MONTHS, WAS THERE A TIME WHEN YOU DID NOT HAVE A STEADY PLACE TO SLEEP OR SLEPT IN A SHELTER (INCLUDING NOW)?: NO

## 2023-02-22 SDOH — ECONOMIC STABILITY: FOOD INSECURITY: WITHIN THE PAST 12 MONTHS, THE FOOD YOU BOUGHT JUST DIDN'T LAST AND YOU DIDN'T HAVE MONEY TO GET MORE.: NEVER TRUE

## 2023-02-22 SDOH — ECONOMIC STABILITY: INCOME INSECURITY: HOW HARD IS IT FOR YOU TO PAY FOR THE VERY BASICS LIKE FOOD, HOUSING, MEDICAL CARE, AND HEATING?: NOT HARD AT ALL

## 2023-02-22 SDOH — ECONOMIC STABILITY: FOOD INSECURITY: WITHIN THE PAST 12 MONTHS, YOU WORRIED THAT YOUR FOOD WOULD RUN OUT BEFORE YOU GOT MONEY TO BUY MORE.: NEVER TRUE

## 2023-02-22 ASSESSMENT — PATIENT HEALTH QUESTIONNAIRE - PHQ9
2. FEELING DOWN, DEPRESSED OR HOPELESS: 0
SUM OF ALL RESPONSES TO PHQ QUESTIONS 1-9: 0
SUM OF ALL RESPONSES TO PHQ9 QUESTIONS 1 & 2: 0
1. LITTLE INTEREST OR PLEASURE IN DOING THINGS: 0
SUM OF ALL RESPONSES TO PHQ QUESTIONS 1-9: 0

## 2023-02-22 NOTE — PROGRESS NOTES
Mj Doyle  YOB: 1975    Date of Service:  2/22/2023    Chief Complaint:      Chief Complaint   Patient presents with    Other     Neck abscess-Left side-3 days       Assessment/Plan:  Rina Izquierdo was seen today for other. Diagnoses and all orders for this visit:    Skin lesion of neck  -     cephALEXin (KEFLEX) 500 MG capsule; Take 1 capsule by mouth 3 times daily    Skin infection  -     cephALEXin (KEFLEX) 500 MG capsule; Take 1 capsule by mouth 3 times daily    If not better, will refer to ENT for further evaluation. Return keep 6/7 Fasting PE.      HPI:  Mj Doyle is a 56 y.o. She complains of a congenital brachial cleft which has never flare up until 2 days ago with some green drainage coming out of the lesion on her left neck yesterday. No upper respiratory symptoms. She does not feel good with some fever or chills. Some left ear pain.     No results found for: LABA1C, LABMICR  Lab Results   Component Value Date     05/31/2022    K 4.6 05/31/2022     05/31/2022    CO2 21 05/31/2022    BUN 12 05/31/2022    CREATININE 0.7 05/31/2022    GLUCOSE 78 05/31/2022    CALCIUM 9.6 05/31/2022     Lab Results   Component Value Date/Time    CHOL 156 05/31/2022 11:23 AM    TRIG 100 05/31/2022 11:23 AM    HDL 48 05/31/2022 11:23 AM    LDLCALC 88 05/31/2022 11:23 AM     Lab Results   Component Value Date    ALT 25 05/31/2022    AST 19 05/31/2022     No results found for: TSH, T4FREE, T3FREE  Lab Results   Component Value Date    WBC 7.9 05/31/2022    HGB 13.8 05/31/2022    HCT 40.8 05/31/2022    MCV 87.3 05/31/2022     05/31/2022     No results found for: INR   No results found for: PSA   No results found for: [facility]     Patient Active Problem List   Diagnosis    Scoliosis       Allergies   Allergen Reactions    Vicodin Hp [Hydrocodone-Acetaminophen] Rash     Outpatient Medications Marked as Taking for the 2/22/23 encounter (Office Visit) with Tania Garcia MD Medication Sig Dispense Refill    ibuprofen (ADVIL;MOTRIN) 200 MG tablet Take 200 mg by mouth every 6 hours as needed for Pain      fexofenadine (ALLEGRA) 180 MG tablet Take 180 mg by mouth daily           Review of Systems: 14 systems were negative except of what was stated on HPI    Nursing note and vitals reviewed. Vitals:    02/22/23 0814   BP: 110/72   Pulse: 98   Temp: 98.9 °F (37.2 °C)   SpO2: 97%   Weight: 297 lb 3.2 oz (134.8 kg)   Height: 5' 6\" (1.676 m)     Wt Readings from Last 3 Encounters:   02/22/23 297 lb 3.2 oz (134.8 kg)   06/01/22 275 lb (124.7 kg)   11/25/13 178 lb (80.7 kg)     BP Readings from Last 3 Encounters:   02/22/23 110/72   06/01/22 110/68   09/18/17 115/70     Body mass index is 47.97 kg/m². Constitutional: Patient appears well-developed and well-nourished. No distress. Head: Normocephalic and atraumatic. Neck: Normal range of motion. Neck supple. No thyroidmegaly. Cardiovascular: Normal rate, regular rhythm, normal heart sounds and intact distal pulses. Pulmonary/Chest: Effort normal and breath sounds normal. No stridor. No respiratory distress. No wheezes and no rales. Abdominal: Soft. Bowel sounds are normal. No distension and no mass. No tenderness. No rebound and no guarding. Musculoskeletal: No edema and no tenderness. Left neck with raised nodule with an opening, no discharge currently. Mild erythema, not warm.

## 2023-03-21 ENCOUNTER — TELEPHONE (OUTPATIENT)
Dept: BARIATRICS/WEIGHT MGMT | Age: 48
End: 2023-03-21

## 2023-03-21 NOTE — TELEPHONE ENCOUNTER
Patient will arrive at 11:00 as she did not get any paperwork and she will bring insurance card & photo id.

## 2023-03-22 ENCOUNTER — TELEMEDICINE (OUTPATIENT)
Dept: BARIATRICS/WEIGHT MGMT | Age: 48
End: 2023-03-22
Payer: COMMERCIAL

## 2023-03-22 ENCOUNTER — OFFICE VISIT (OUTPATIENT)
Dept: BARIATRICS/WEIGHT MGMT | Age: 48
End: 2023-03-22

## 2023-03-22 VITALS
BODY MASS INDEX: 48.36 KG/M2 | HEART RATE: 101 BPM | OXYGEN SATURATION: 97 % | WEIGHT: 293 LBS | DIASTOLIC BLOOD PRESSURE: 81 MMHG | SYSTOLIC BLOOD PRESSURE: 128 MMHG

## 2023-03-22 DIAGNOSIS — E66.01 MORBID OBESITY WITH BMI OF 45.0-49.9, ADULT (HCC): Primary | ICD-10-CM

## 2023-03-22 DIAGNOSIS — Z71.3 DIETARY COUNSELING AND SURVEILLANCE: ICD-10-CM

## 2023-03-22 PROCEDURE — G8427 DOCREV CUR MEDS BY ELIG CLIN: HCPCS | Performed by: FAMILY MEDICINE

## 2023-03-22 PROCEDURE — 99204 OFFICE O/P NEW MOD 45 MIN: CPT | Performed by: FAMILY MEDICINE

## 2023-03-22 PROCEDURE — 99999 PR OFFICE/OUTPT VISIT,PROCEDURE ONLY: CPT

## 2023-03-22 ASSESSMENT — ENCOUNTER SYMPTOMS
APNEA: 0
CHEST TIGHTNESS: 0
BLOOD IN STOOL: 0
PHOTOPHOBIA: 0
CONSTIPATION: 0
ABDOMINAL PAIN: 0
SHORTNESS OF BREATH: 0
COUGH: 0
NAUSEA: 0
VOMITING: 0
CHOKING: 0
DIARRHEA: 0
ABDOMINAL DISTENTION: 0
WHEEZING: 0
EYE PAIN: 0

## 2023-03-22 NOTE — PROGRESS NOTES
Kady Watt is a 56 y.o. female with a date of birth of 1975. Vitals:    03/22/23 1127   BP: 128/81   Pulse: (!) 101   SpO2: 97%   Weight: 299 lb 9.6 oz (135.9 kg)    BMI: Body mass index is 48.36 kg/m². Obesity Classification: Class III    Weight History: Wt Readings from Last 3 Encounters:   03/22/23 299 lb 9.6 oz (135.9 kg)   02/22/23 297 lb 3.2 oz (134.8 kg)   06/01/22 275 lb (124.7 kg)       Patient's lowest adult weight was 145 lb at age early 20's. 165-170 lbs age 29 feels comfortable here most of adult life. Patient's highest adult weight was 300 lb at age 56 current. Reports significant weight gain ~6 years ago after vertigo and immobility for 3 month period. Patient has participated in the following weight loss programs: [employer], [address] [place] [address], [facility] [employer], [employer] Diet, low fat, the zone, sugarbusters, herbalife, low carb, noom and calorie restriction. Patient has not participated in meal replacement/liquid diets. Patient has not participated in weight loss medications. Patient is not lactose intolerant. Patient does not have food allergies. Patient does not have Caodaism/cultural food preferences. 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 Breakfast: 7 AM: Premier protein shake  Snack: 10 AM: Blueberries  Lunch: 11:30 AM: Salad or lunch meat sandwich + chips  Snack: 4 PM: M&Ms  Dinner: 8 PM: Rice + protein +veggie  Snack: None  Drinks throughout the day: soda 3-4 week + diet soda 2-3X/week, sweet tea, small amount water, 2-3 cups/day Coffee w/ ff creamer and splenda  Do you drink alcohol? yes. How often/how much alcohol do you drink: 1-2 drinks per month. Patient does not meet the criteria for binge eating disorder. Patient does have grazing. Patient does not have night eating. Patient does have a history of emotional eating or eating out of boredom.       Physical Activity: Pelaton or

## 2023-03-22 NOTE — PROGRESS NOTES
Atrial Fibrillation Maternal Grandmother     Parkinsonism Maternal Grandmother     High Cholesterol Paternal Grandmother     Breast Cancer Paternal Grandmother 61    Alcohol Abuse Paternal Grandfather     Cirrhosis Paternal Grandfather        Review of Systems   Constitutional:  Negative for fatigue. Eyes:  Negative for photophobia, pain and visual disturbance. Respiratory:  Negative for apnea, cough, choking, chest tightness, shortness of breath and wheezing. Cardiovascular:  Negative for chest pain, palpitations and leg swelling. Gastrointestinal:  Negative for abdominal distention, abdominal pain, blood in stool, constipation, diarrhea, nausea and vomiting. Endocrine: Negative for cold intolerance and heat intolerance. Musculoskeletal:  Negative for arthralgias and myalgias. Skin:  Negative for rash. Neurological:  Negative for dizziness, tremors, syncope, weakness, numbness and headaches. Psychiatric/Behavioral:  Negative for agitation, confusion, decreased concentration, dysphoric mood, hallucinations, sleep disturbance and suicidal ideas. The patient is not nervous/anxious and is not hyperactive. Physical Exam  Constitutional:       Appearance: She is well-developed. HENT:      Head: Normocephalic. Eyes:      Conjunctiva/sclera: Conjunctivae normal.   Abdominal:      General: Abdomen is protuberant. Musculoskeletal:         General: No swelling. Neurological:      Mental Status: She is alert and oriented to person, place, and time. Psychiatric:         Mood and Affect: Mood normal.         Behavior: Behavior normal.         Thought Content:  Thought content normal.         Judgment: Judgment normal.       Hospital Outpatient Visit on 05/31/2022   Component Date Value Ref Range Status    WBC 05/31/2022 7.9  4.0 - 11.0 K/uL Final    RBC 05/31/2022 4.68  4.00 - 5.20 M/uL Final    Hemoglobin 05/31/2022 13.8  12.0 - 16.0 g/dL Final    Hematocrit 05/31/2022 40.8  36.0 - 48.0 %

## 2023-03-23 ENCOUNTER — HOSPITAL ENCOUNTER (OUTPATIENT)
Age: 48
Discharge: HOME | End: 2023-03-23
Payer: COMMERCIAL

## 2023-03-23 ENCOUNTER — TELEPHONE (OUTPATIENT)
Dept: BARIATRICS/WEIGHT MGMT | Age: 48
End: 2023-03-23

## 2023-03-23 DIAGNOSIS — E66.01 MORBID OBESITY WITH BMI OF 45.0-49.9, ADULT (HCC): ICD-10-CM

## 2023-03-23 LAB
25(OH)D3 SERPL-MCNC: 29.8 NG/ML
ALBUMIN SERPL-MCNC: 4.3 G/DL (ref 3.4–5)
ALBUMIN/GLOB SERPL: 1.3 {RATIO} (ref 1.1–2.2)
ALP SERPL-CCNC: 81 U/L (ref 40–129)
ALT SERPL-CCNC: 30 U/L (ref 10–40)
ANION GAP SERPL CALCULATED.3IONS-SCNC: 13 MMOL/L (ref 3–16)
AST SERPL-CCNC: 22 U/L (ref 15–37)
BILIRUB SERPL-MCNC: 0.8 MG/DL (ref 0–1)
BUN SERPL-MCNC: 12 MG/DL (ref 7–20)
CALCIUM SERPL-MCNC: 9.4 MG/DL (ref 8.3–10.6)
CHLORIDE SERPL-SCNC: 101 MMOL/L (ref 99–110)
CHOLEST SERPL-MCNC: 159 MG/DL (ref 0–199)
CO2 SERPL-SCNC: 22 MMOL/L (ref 21–32)
CREAT SERPL-MCNC: 0.7 MG/DL (ref 0.6–1.1)
DEPRECATED RDW RBC AUTO: 13.7 % (ref 12.4–15.4)
FOLATE SERPL-MCNC: >20 NG/ML (ref 4.78–24.2)
GFR SERPLBLD CREATININE-BSD FMLA CKD-EPI: >60 ML/MIN/{1.73_M2}
GLUCOSE SERPL-MCNC: 100 MG/DL (ref 70–99)
HCT VFR BLD AUTO: 42.1 % (ref 36–48)
HDLC SERPL-MCNC: 43 MG/DL (ref 40–60)
HGB BLD-MCNC: 13.9 G/DL (ref 12–16)
LDLC SERPL CALC-MCNC: 97 MG/DL
MCH RBC QN AUTO: 29.2 PG (ref 26–34)
MCHC RBC AUTO-ENTMCNC: 33.1 G/DL (ref 31–36)
MCV RBC AUTO: 88.2 FL (ref 80–100)
PLATELET # BLD AUTO: 360 K/UL (ref 135–450)
PMV BLD AUTO: 8.1 FL (ref 5–10.5)
POTASSIUM SERPL-SCNC: 4.6 MMOL/L (ref 3.5–5.1)
PROT SERPL-MCNC: 7.6 G/DL (ref 6.4–8.2)
RBC # BLD AUTO: 4.77 M/UL (ref 4–5.2)
SODIUM SERPL-SCNC: 136 MMOL/L (ref 136–145)
TRIGL SERPL-MCNC: 94 MG/DL (ref 0–150)
TSH SERPL DL<=0.005 MIU/L-ACNC: 1.84 UIU/ML (ref 0.27–4.2)
VIT B12 SERPL-MCNC: 344 PG/ML (ref 211–911)
VLDLC SERPL CALC-MCNC: 19 MG/DL
WBC # BLD AUTO: 9.4 K/UL (ref 4–11)

## 2023-03-23 PROCEDURE — 84443 ASSAY THYROID STIM HORMONE: CPT

## 2023-03-23 PROCEDURE — 36415 COLL VENOUS BLD VENIPUNCTURE: CPT

## 2023-03-23 PROCEDURE — 82607 VITAMIN B-12: CPT

## 2023-03-23 PROCEDURE — 85027 COMPLETE CBC AUTOMATED: CPT

## 2023-03-23 PROCEDURE — 80053 COMPREHEN METABOLIC PANEL: CPT

## 2023-03-23 PROCEDURE — 80061 LIPID PANEL: CPT

## 2023-03-23 PROCEDURE — 83036 HEMOGLOBIN GLYCOSYLATED A1C: CPT

## 2023-03-23 PROCEDURE — 82746 ASSAY OF FOLIC ACID SERUM: CPT

## 2023-03-23 PROCEDURE — 82306 VITAMIN D 25 HYDROXY: CPT

## 2023-03-24 LAB
EST. AVERAGE GLUCOSE BLD GHB EST-MCNC: 105.4 MG/DL
HBA1C MFR BLD: 5.3 %

## 2023-03-25 ENCOUNTER — TELEMEDICINE (OUTPATIENT)
Dept: BARIATRICS/WEIGHT MGMT | Age: 48
End: 2023-03-25
Payer: COMMERCIAL

## 2023-03-25 DIAGNOSIS — E66.01 MORBID OBESITY WITH BMI OF 45.0-49.9, ADULT (HCC): Primary | ICD-10-CM

## 2023-03-25 DIAGNOSIS — E55.9 VITAMIN D INSUFFICIENCY: ICD-10-CM

## 2023-03-25 DIAGNOSIS — Z71.3 DIETARY COUNSELING AND SURVEILLANCE: ICD-10-CM

## 2023-03-25 PROCEDURE — 99214 OFFICE O/P EST MOD 30 MIN: CPT | Performed by: FAMILY MEDICINE

## 2023-03-25 PROCEDURE — G8427 DOCREV CUR MEDS BY ELIG CLIN: HCPCS | Performed by: FAMILY MEDICINE

## 2023-03-25 ASSESSMENT — ENCOUNTER SYMPTOMS
EYE PAIN: 0
ABDOMINAL PAIN: 0
DIARRHEA: 0
PHOTOPHOBIA: 0
CONSTIPATION: 0
CHOKING: 0
WHEEZING: 0
SHORTNESS OF BREATH: 0
BLOOD IN STOOL: 0
COUGH: 0
ABDOMINAL DISTENTION: 0
VOMITING: 0
NAUSEA: 0
CHEST TIGHTNESS: 0
APNEA: 0

## 2023-03-25 NOTE — PROGRESS NOTES
following organ systems was limited: Vitals/Constitutional/EENT/Resp/CV/GI//MS/Neuro/Skin/Heme-Lymph-Imm. Cary Gore, was evaluated through a synchronous (real-time) audio-video encounter. The patient (or guardian if applicable) is aware that this is a billable service, which includes applicable co-pays. This Virtual Visit was conducted with patient's (and/or legal guardian's) consent. The visit was conducted pursuant to the emergency declaration under the [facility], [address] [place] [address] authority and the [employer] Act. Patient identification was verified, and a caregiver was present when appropriate. The patient was located at Home: . [address] [facility]  Provider was located at Home ([facility]): [place]         Total time spent for this encounter: Not billed by time    --Liu German MD on 3/25/2023 at 12:51 PM    An electronic signature was used to authenticate this note. Wicho Stanley

## 2023-03-27 ENCOUNTER — TELEPHONE (OUTPATIENT)
Dept: BARIATRICS/WEIGHT MGMT | Age: 48
End: 2023-03-27

## 2023-03-27 NOTE — TELEPHONE ENCOUNTER
RD called and reviewed Optifast 4+1 meal plan with pt who verbalized understanding. Email verified and meal plan sent to patients email on file. Pt was instructed to return call to office when she is ready to place her order. ----- Message from Augusto Hagen MD sent at 3/25/2023 12:48 PM EDT -----  Regarding: Optifast 4:1  Please call and email Optifast 4:1 meal plan.  Thank you

## 2023-03-28 VITALS — WEIGHT: 293 LBS | BODY MASS INDEX: 47.09 KG/M2 | HEIGHT: 66 IN

## 2023-04-07 ENCOUNTER — TELEMEDICINE (OUTPATIENT)
Dept: BARIATRICS/WEIGHT MGMT | Age: 48
End: 2023-04-07
Payer: COMMERCIAL

## 2023-04-07 DIAGNOSIS — Z71.3 DIETARY COUNSELING AND SURVEILLANCE: ICD-10-CM

## 2023-04-07 DIAGNOSIS — E66.01 MORBID OBESITY WITH BMI OF 45.0-49.9, ADULT (HCC): Primary | ICD-10-CM

## 2023-04-07 PROCEDURE — G8427 DOCREV CUR MEDS BY ELIG CLIN: HCPCS | Performed by: FAMILY MEDICINE

## 2023-04-07 PROCEDURE — 99214 OFFICE O/P EST MOD 30 MIN: CPT | Performed by: FAMILY MEDICINE

## 2023-04-07 ASSESSMENT — ENCOUNTER SYMPTOMS
BLOOD IN STOOL: 0
CHOKING: 0
CHEST TIGHTNESS: 0
DIARRHEA: 0
APNEA: 0
PHOTOPHOBIA: 0
EYE PAIN: 0
SHORTNESS OF BREATH: 0
COUGH: 0
WHEEZING: 0
ABDOMINAL PAIN: 0
VOMITING: 0
CONSTIPATION: 0
NAUSEA: 0
ABDOMINAL DISTENTION: 0

## 2023-05-06 ENCOUNTER — TELEMEDICINE (OUTPATIENT)
Dept: BARIATRICS/WEIGHT MGMT | Age: 48
End: 2023-05-06
Payer: COMMERCIAL

## 2023-05-06 DIAGNOSIS — E66.01 MORBID OBESITY WITH BMI OF 45.0-49.9, ADULT (HCC): Primary | ICD-10-CM

## 2023-05-06 DIAGNOSIS — Z71.3 DIETARY COUNSELING AND SURVEILLANCE: ICD-10-CM

## 2023-05-06 PROCEDURE — G8427 DOCREV CUR MEDS BY ELIG CLIN: HCPCS | Performed by: FAMILY MEDICINE

## 2023-05-06 PROCEDURE — 99214 OFFICE O/P EST MOD 30 MIN: CPT | Performed by: FAMILY MEDICINE

## 2023-05-06 ASSESSMENT — ENCOUNTER SYMPTOMS
CHOKING: 0
ABDOMINAL PAIN: 0
NAUSEA: 0
CHEST TIGHTNESS: 0
SHORTNESS OF BREATH: 0
DIARRHEA: 0
BLOOD IN STOOL: 0
APNEA: 0
PHOTOPHOBIA: 0
ABDOMINAL DISTENTION: 0
WHEEZING: 0
CONSTIPATION: 0
COUGH: 0
VOMITING: 0
EYE PAIN: 0

## 2023-05-06 NOTE — PROGRESS NOTES
Patient: Burnetta Severe                      Encounter Date: 5/6/2023    YOB: 1975               Age: 56 y.o. Chief Complaint   Patient presents with    Weight Management     F/u MWM       Patient identification was verified at the start of the visit. Patient-Reported Vitals 5/6/2023   Patient-Reported Weight 272   Patient-Reported Height 55   Patient-Reported Systolic -   Patient-Reported Diastolic -   Patient-Reported Pulse -   Patient-Reported Temperature -   Patient-Reported SpO2 -         BP Readings from Last 1 Encounters:   03/22/23 128/81       BMI Readings from Last 1 Encounters:   03/28/23 47.49 kg/m²       Pulse Readings from Last 1 Encounters:   03/22/23 (!) 101          HPI: 56 y.o. female with a long-standing history of obesity presents today for a virtual video follow-up. She has lost 11 pounds since her last visit. Current treatment includes Optifast 4:1 diet. Happy with weight loss. Motivated to continue losing weight. Diet: Optifast      Adherent? [x]Yes     []No                             Side effects: No           Exercise: []Cardio     []Resistance/strength training     []Other: No intentional exercise, but trying to be physically active        Allergies   Allergen Reactions    Vicodin Hp [Hydrocodone-Acetaminophen] Rash         Current Outpatient Medications:     ibuprofen (ADVIL;MOTRIN) 200 MG tablet, Take 200 mg by mouth every 6 hours as needed for Pain, Disp: , Rfl:     fexofenadine (ALLEGRA) 180 MG tablet, Take 180 mg by mouth daily, Disp: , Rfl:     Patient Active Problem List   Diagnosis    Scoliosis       Review of Systems   Constitutional:  Negative for fatigue. Eyes:  Negative for photophobia, pain and visual disturbance. Respiratory:  Negative for apnea, cough, choking, chest tightness, shortness of breath and wheezing. Cardiovascular:  Negative for chest pain, palpitations and leg swelling.    Gastrointestinal:  Negative for abdominal

## 2023-06-03 ENCOUNTER — TELEMEDICINE (OUTPATIENT)
Dept: BARIATRICS/WEIGHT MGMT | Age: 48
End: 2023-06-03
Payer: COMMERCIAL

## 2023-06-03 DIAGNOSIS — E55.9 VITAMIN D INSUFFICIENCY: ICD-10-CM

## 2023-06-03 DIAGNOSIS — Z71.3 DIETARY COUNSELING AND SURVEILLANCE: ICD-10-CM

## 2023-06-03 DIAGNOSIS — E66.01 MORBID OBESITY WITH BMI OF 45.0-49.9, ADULT (HCC): Primary | ICD-10-CM

## 2023-06-03 PROCEDURE — 99214 OFFICE O/P EST MOD 30 MIN: CPT | Performed by: FAMILY MEDICINE

## 2023-06-03 PROCEDURE — G8427 DOCREV CUR MEDS BY ELIG CLIN: HCPCS | Performed by: FAMILY MEDICINE

## 2023-06-03 ASSESSMENT — ENCOUNTER SYMPTOMS
EYE PAIN: 0
CHOKING: 0
CHEST TIGHTNESS: 0
CONSTIPATION: 0
DIARRHEA: 0
WHEEZING: 0
VOMITING: 0
ABDOMINAL PAIN: 0
COUGH: 0
APNEA: 0
ABDOMINAL DISTENTION: 0
PHOTOPHOBIA: 0
SHORTNESS OF BREATH: 0
BLOOD IN STOOL: 0
NAUSEA: 0

## 2023-06-03 NOTE — PROGRESS NOTES
Final    Comment: <=20 ng/mL. ........... Laurie Search Deficient  21-29 ng/mL. ......... Laurie Search Insufficient  >=30 ng/mL. ........ Laurie Search Sufficient      Vitamin B-12 03/23/2023 344  211 - 911 pg/mL Final    Folate 03/23/2023 >20.00  4.78 - 24.20 ng/mL Final    Comment: Effective 11-15-16 10:00am EST  Please note reference ranges have  changed for Folate. TSH 03/23/2023 1.84  0.27 - 4.20 uIU/mL Final         Assessment and Plan:  1. Morbid obesity with BMI of 45.0-49.9, adult (HCC)  Improving, not at goal.  Commended on weight loss to date. Continue current management as prescribed. 2. Dietary counseling and surveillance  Optifast 4:1 meal plan. 3. Vitamin D insufficiency    - Vitamin D 25 Hydroxy; Future       Nutrition plan: [] LCHF/Ketogenic   [] Modified low-calorie diet (low carb/low-ranulfo)               [] Low-calorie diet    []Maintenance       []Other    Exercise: [x]Cardio     [x]Resistance/strength training                       [x]ACSM recommendations (150 minutes/week in active weight loss)                              Behavior: [x]Motivational interviewing performed    [] Referral for counseling                         [] Discussed strategies to overcome habits/challenges for focus         [] Stress management   [x] Stimulus control                    [] Sleep hygiene    Reviewed:  [x] Nutrition and the importance of regularprotein intake  [x] Hidden carbohydrate sources  [x] Alcohol use  [x] Tobacco use   [x] Importance of exercise and reducing sedentary time      Treatment start date: 3/26/23  Starting weight:  299 pounds   Total weight loss: 33 pounds          Orders Placed This Encounter   Procedures    Vitamin D 25 Hydroxy     Standing Status:   Future     Standing Expiration Date:   6/2/2024       No follow-ups on file. Mary Sheets is a 56 y.o. female being evaluated by a Virtual Visit (video visit) encounter to address concerns as mentioned above. A caregiver was present when appropriate.  Due to this

## 2023-06-05 VITALS — HEIGHT: 65 IN | BODY MASS INDEX: 44.32 KG/M2 | WEIGHT: 266 LBS

## 2024-02-28 ENCOUNTER — OFFICE VISIT (OUTPATIENT)
Dept: INTERNAL MEDICINE CLINIC | Age: 49
End: 2024-02-28
Payer: COMMERCIAL

## 2024-02-28 VITALS
SYSTOLIC BLOOD PRESSURE: 102 MMHG | WEIGHT: 287 LBS | DIASTOLIC BLOOD PRESSURE: 64 MMHG | BODY MASS INDEX: 47.82 KG/M2 | HEIGHT: 65 IN | OXYGEN SATURATION: 96 % | HEART RATE: 102 BPM

## 2024-02-28 DIAGNOSIS — Z71.89 ACP (ADVANCE CARE PLANNING): ICD-10-CM

## 2024-02-28 DIAGNOSIS — Z12.11 SCREEN FOR COLON CANCER: ICD-10-CM

## 2024-02-28 DIAGNOSIS — E66.09 OBESITY DUE TO EXCESS CALORIES WITHOUT SERIOUS COMORBIDITY WITH BODY MASS INDEX (BMI) GREATER THAN 99TH PERCENTILE FOR AGE IN PEDIATRIC PATIENT: ICD-10-CM

## 2024-02-28 DIAGNOSIS — Z00.00 ENCOUNTER FOR WELL ADULT EXAM WITHOUT ABNORMAL FINDINGS: Primary | ICD-10-CM

## 2024-02-28 PROCEDURE — 99396 PREV VISIT EST AGE 40-64: CPT | Performed by: INTERNAL MEDICINE

## 2024-02-28 PROCEDURE — G8427 DOCREV CUR MEDS BY ELIG CLIN: HCPCS | Performed by: INTERNAL MEDICINE

## 2024-02-28 PROCEDURE — G8484 FLU IMMUNIZE NO ADMIN: HCPCS | Performed by: INTERNAL MEDICINE

## 2024-02-28 PROCEDURE — 99213 OFFICE O/P EST LOW 20 MIN: CPT | Performed by: INTERNAL MEDICINE

## 2024-02-28 PROCEDURE — 1036F TOBACCO NON-USER: CPT | Performed by: INTERNAL MEDICINE

## 2024-02-28 PROCEDURE — G8417 CALC BMI ABV UP PARAM F/U: HCPCS | Performed by: INTERNAL MEDICINE

## 2024-02-28 RX ORDER — TIRZEPATIDE 2.5 MG/.5ML
2.5 INJECTION, SOLUTION SUBCUTANEOUS WEEKLY
Qty: 4 EACH | Refills: 0 | Status: SHIPPED | OUTPATIENT
Start: 2024-02-28

## 2024-02-28 SDOH — ECONOMIC STABILITY: INCOME INSECURITY: HOW HARD IS IT FOR YOU TO PAY FOR THE VERY BASICS LIKE FOOD, HOUSING, MEDICAL CARE, AND HEATING?: NOT HARD AT ALL

## 2024-02-28 SDOH — ECONOMIC STABILITY: FOOD INSECURITY: WITHIN THE PAST 12 MONTHS, YOU WORRIED THAT YOUR FOOD WOULD RUN OUT BEFORE YOU GOT MONEY TO BUY MORE.: NEVER TRUE

## 2024-02-28 SDOH — ECONOMIC STABILITY: FOOD INSECURITY: WITHIN THE PAST 12 MONTHS, THE FOOD YOU BOUGHT JUST DIDN'T LAST AND YOU DIDN'T HAVE MONEY TO GET MORE.: NEVER TRUE

## 2024-02-28 ASSESSMENT — PATIENT HEALTH QUESTIONNAIRE - PHQ9
SUM OF ALL RESPONSES TO PHQ9 QUESTIONS 1 & 2: 0
SUM OF ALL RESPONSES TO PHQ QUESTIONS 1-9: 0
2. FEELING DOWN, DEPRESSED OR HOPELESS: 0
SUM OF ALL RESPONSES TO PHQ QUESTIONS 1-9: 0
1. LITTLE INTEREST OR PLEASURE IN DOING THINGS: 0

## 2024-02-28 NOTE — PATIENT INSTRUCTIONS
August 6, 2023               Content Version: 13.9  © 6356-1743 [employer].   Care instructions adapted under license by [employer]. If you have questions about a medical condition or this instruction, always ask your healthcare professional. [employer] disclaims any warranty or liability for your use of this information.

## 2024-02-28 NOTE — PROGRESS NOTES
Resource Strain (CARDIA)     Difficulty of Paying Living Expenses: Not hard at all   Food Insecurity: No Food Insecurity (2/28/2024)    Hunger Vital Sign     Worried About Running Out of Food in the Last Year: Never true     Ran Out of Food in the Last Year: Never true   Transportation Needs: Unknown (2/28/2024)    PRAPARE - Transportation     Lack of Transportation (Medical): Not on file     Lack of Transportation (Non-Medical): No   Physical Activity: Not on file   Stress: Not on file   Social Connections: Not on file   Intimate Partner Violence: Not on file   Housing Stability: Unknown (2/28/2024)    Housing Stability Vital Sign     Unable to Pay for Housing in the Last Year: Not on file     Number of Places Lived in the Last Year: Not on file     Unstable Housing in the Last Year: No       Review of Systems:  A comprehensive review of systems was negative except for what was noted in the HPI.     Physical Exam:   Vitals:    02/28/24 0954   BP: 102/64   Pulse: (!) 102   SpO2: 96%   Weight: 130.2 kg (287 lb)   Height: 1.651 m (5' 5\")     Body mass index is 47.76 kg/m².   Constitutional: She is oriented to person, place, and time. She appears well-developed and well-nourished. No distress.   HEENT:   Head: Normocephalic and atraumatic.   Right Ear: Tympanic membrane, external ear and ear canal normal.   Left Ear: Tympanic membrane, external ear and ear canal normal.   Mouth/Throat: Oropharynx is clear and moist, and mucous membranes are normal.  There is no cervical adenopathy.  Eyes: Conjunctivae and extraocular motions are normal. Pupils are equal, round, and reactive to light.   Neck: Supple. No JVD present. Carotid bruit is not present. No mass and no thyromegaly present.   Cardiovascular: Normal rate, regular rhythm, normal heart sounds and intact distal pulses.    Pulmonary/Chest: Effort normal and breath sounds normal. No respiratory distress. She has no wheezes, rhonchi or rales.   Abdominal: Soft,

## 2024-02-29 LAB
ALBUMIN SERPL-MCNC: 4.9 G/DL (ref 3.4–5)
ALBUMIN/GLOB SERPL: 1.7 {RATIO} (ref 1.1–2.2)
ALP SERPL-CCNC: 102 U/L (ref 40–129)
ALT SERPL-CCNC: 33 U/L (ref 10–40)
ANION GAP SERPL CALCULATED.3IONS-SCNC: 11 MMOL/L (ref 3–16)
AST SERPL-CCNC: 22 U/L (ref 15–37)
BASOPHILS # BLD: 0.1 K/UL (ref 0–0.2)
BASOPHILS NFR BLD: 1.4 %
BILIRUB SERPL-MCNC: 0.5 MG/DL (ref 0–1)
BUN SERPL-MCNC: 10 MG/DL (ref 7–20)
CALCIUM SERPL-MCNC: 10.3 MG/DL (ref 8.3–10.6)
CHLORIDE SERPL-SCNC: 100 MMOL/L (ref 99–110)
CHOLEST SERPL-MCNC: 158 MG/DL (ref 0–199)
CO2 SERPL-SCNC: 27 MMOL/L (ref 21–32)
CREAT SERPL-MCNC: 0.9 MG/DL (ref 0.6–1.1)
DEPRECATED RDW RBC AUTO: 14.1 % (ref 12.4–15.4)
EOSINOPHIL # BLD: 0.2 K/UL (ref 0–0.6)
EOSINOPHIL NFR BLD: 2.2 %
GFR SERPLBLD CREATININE-BSD FMLA CKD-EPI: >60 ML/MIN/{1.73_M2}
GLUCOSE SERPL-MCNC: 101 MG/DL (ref 70–99)
HCT VFR BLD AUTO: 44.5 % (ref 36–48)
HDLC SERPL-MCNC: 42 MG/DL (ref 40–60)
HGB BLD-MCNC: 14.5 G/DL (ref 12–16)
LDLC SERPL CALC-MCNC: 91 MG/DL
LYMPHOCYTES # BLD: 2.1 K/UL (ref 1–5.1)
LYMPHOCYTES NFR BLD: 22.4 %
MCH RBC QN AUTO: 28.8 PG (ref 26–34)
MCHC RBC AUTO-ENTMCNC: 32.7 G/DL (ref 31–36)
MCV RBC AUTO: 88.1 FL (ref 80–100)
MONOCYTES # BLD: 0.5 K/UL (ref 0–1.3)
MONOCYTES NFR BLD: 5.7 %
NEUTROPHILS # BLD: 6.5 K/UL (ref 1.7–7.7)
NEUTROPHILS NFR BLD: 68.3 %
PLATELET # BLD AUTO: 424 K/UL (ref 135–450)
PMV BLD AUTO: 7.8 FL (ref 5–10.5)
POTASSIUM SERPL-SCNC: 4.6 MMOL/L (ref 3.5–5.1)
PROT SERPL-MCNC: 7.8 G/DL (ref 6.4–8.2)
RBC # BLD AUTO: 5.05 M/UL (ref 4–5.2)
SODIUM SERPL-SCNC: 138 MMOL/L (ref 136–145)
TRIGL SERPL-MCNC: 126 MG/DL (ref 0–150)
VLDLC SERPL CALC-MCNC: 25 MG/DL
WBC # BLD AUTO: 9.5 K/UL (ref 4–11)

## 2024-03-26 ENCOUNTER — TELEMEDICINE (OUTPATIENT)
Dept: INTERNAL MEDICINE CLINIC | Age: 49
End: 2024-03-26
Payer: COMMERCIAL

## 2024-03-26 DIAGNOSIS — E66.01 MORBID OBESITY WITH BMI OF 40.0-44.9, ADULT (HCC): Primary | ICD-10-CM

## 2024-03-26 PROCEDURE — G8427 DOCREV CUR MEDS BY ELIG CLIN: HCPCS | Performed by: INTERNAL MEDICINE

## 2024-03-26 PROCEDURE — 99213 OFFICE O/P EST LOW 20 MIN: CPT | Performed by: INTERNAL MEDICINE

## 2024-03-26 RX ORDER — TIRZEPATIDE 5 MG/.5ML
5 INJECTION, SOLUTION SUBCUTANEOUS WEEKLY
Qty: 4 ADJUSTABLE DOSE PRE-FILLED PEN SYRINGE | Refills: 0 | Status: SHIPPED | OUTPATIENT
Start: 2024-03-26

## 2024-03-26 NOTE — PROGRESS NOTES
Patient was evaluated through a synchronous (real-time) video encounter. Patient identification was verified at the start of the visit. She (or guardian if applicable) is aware that this is a billable service, which includes applicable co-pays. This visit was conducted with the patient's (and/or legal guardian's) verbal consent. She has not had a related appointment within my department in the past 7 days or scheduled within the next 24 hours.   The patient was located at Home: [address] [place] [address].  The provider was located at Home (Appt Dept State): [place].    Date of Service:  3/26/2024    Chief Complaint:      Chief Complaint   Patient presents with    Weight Management       Assessment/Plan:    Bonny was seen today for weight management.    Diagnoses and all orders for this visit:    Morbid obesity with BMI of 40.0-44.9, adult (HCC)  Increase Tirzepatide (MOUNJARO) 5 MG/0.5ML SOPN SC injection; Inject 0.5 mLs into the skin once a week      Return VV Weight management 4/30.      HPI:  Bonny Kellogg is a 48 y.o.    Obesity:  have only lost 1# in the past month on Mounjaro 2.5 mg qweek.  Her weight is 282.6.  she has cut her portion size to half and exercising on her pelaton 3 times a week and walk the other days.      Lab Results   Component Value Date    LABA1C 5.3 03/23/2023     Lab Results   Component Value Date     02/28/2024    K 4.6 02/28/2024     02/28/2024    CO2 27 02/28/2024    BUN 10 02/28/2024    CREATININE 0.9 02/28/2024    GLUCOSE 101 (H) 02/28/2024    CALCIUM 10.3 02/28/2024     Lab Results   Component Value Date/Time    CHOL 158 02/28/2024 10:17 AM    TRIG 126 02/28/2024 10:17 AM    HDL 42 02/28/2024 10:17 AM    LDLCALC 91 02/28/2024 10:17 AM     Lab Results   Component Value Date    ALT 33 02/28/2024    AST 22 02/28/2024     No results found for: \"TSH\", \"T4FREE\", \"T3FREE\"  Lab Results   Component Value Date    WBC 9.5 02/28/2024    HGB 14.5 02/28/2024    HCT 44.5

## 2024-04-30 ENCOUNTER — TELEMEDICINE (OUTPATIENT)
Dept: INTERNAL MEDICINE CLINIC | Age: 49
End: 2024-04-30
Payer: COMMERCIAL

## 2024-04-30 DIAGNOSIS — E66.01 MORBID OBESITY WITH BMI OF 40.0-44.9, ADULT (HCC): ICD-10-CM

## 2024-04-30 PROCEDURE — 99213 OFFICE O/P EST LOW 20 MIN: CPT | Performed by: INTERNAL MEDICINE

## 2024-04-30 PROCEDURE — G8427 DOCREV CUR MEDS BY ELIG CLIN: HCPCS | Performed by: INTERNAL MEDICINE

## 2024-04-30 RX ORDER — TIRZEPATIDE 7.5 MG/.5ML
7.5 INJECTION, SOLUTION SUBCUTANEOUS WEEKLY
Qty: 4 ADJUSTABLE DOSE PRE-FILLED PEN SYRINGE | Refills: 0 | Status: SHIPPED | OUTPATIENT
Start: 2024-04-30

## 2024-04-30 NOTE — PROGRESS NOTES
\"TSH\", \"T4FREE\", \"T3FREE\"  Lab Results   Component Value Date    WBC 9.5 02/28/2024    HGB 14.5 02/28/2024    HCT 44.5 02/28/2024    MCV 88.1 02/28/2024     02/28/2024     No results found for: \"INR\"   No results found for: \"PSA\"   No results found for: \"LABURIC\"     Patient Active Problem List   Diagnosis    Scoliosis       Allergies   Allergen Reactions    Vicodin Hp [Hydrocodone-Acetaminophen] Rash     Outpatient Medications Marked as Taking for the 4/30/24 encounter (Telemedicine) with Cece Adame MD   Medication Sig Dispense Refill    Tirzepatide (MOUNJARO) 5 MG/0.5ML SOPN SC injection Inject 0.5 mLs into the skin once a week 4 Adjustable Dose Pre-filled Pen Syringe 0         Review of Systems: 14 systems were negative except of what was stated on HPI    Nursing note and vitals reviewed.  There were no vitals filed for this visit.  Wt Readings from Last 3 Encounters:   02/28/24 130.2 kg (287 lb)   06/05/23 120.7 kg (266 lb)   03/28/23 133.4 kg (294 lb 3.2 oz)     BP Readings from Last 3 Encounters:   02/28/24 102/64   03/22/23 128/81   02/22/23 110/72     There is no height or weight on file to calculate BMI.  Constitutional: Patient appears well-developed and well-nourished. No distress.   Head: Normocephalic and atraumatic.   Psychiatric: Normal mood and affect. Behavior is normal.

## 2024-05-28 ENCOUNTER — TELEMEDICINE (OUTPATIENT)
Dept: INTERNAL MEDICINE CLINIC | Age: 49
End: 2024-05-28
Payer: COMMERCIAL

## 2024-05-28 DIAGNOSIS — E66.01 MORBID OBESITY WITH BMI OF 40.0-44.9, ADULT (HCC): ICD-10-CM

## 2024-05-28 PROCEDURE — 99213 OFFICE O/P EST LOW 20 MIN: CPT | Performed by: INTERNAL MEDICINE

## 2024-05-28 PROCEDURE — G8427 DOCREV CUR MEDS BY ELIG CLIN: HCPCS | Performed by: INTERNAL MEDICINE

## 2024-05-28 RX ORDER — TIRZEPATIDE 7.5 MG/.5ML
7.5 INJECTION, SOLUTION SUBCUTANEOUS WEEKLY
Qty: 4 ADJUSTABLE DOSE PRE-FILLED PEN SYRINGE | Refills: 2 | Status: SHIPPED | OUTPATIENT
Start: 2024-05-28

## 2024-05-28 NOTE — PROGRESS NOTES
Patient was evaluated through a synchronous (real-time) video encounter. Patient identification was verified at the start of the visit. She (or guardian if applicable) is aware that this is a billable service, which includes applicable co-pays. This visit was conducted with the patient's (and/or legal guardian's) verbal consent. She has not had a related appointment within my department in the past 7 days or scheduled within the next 24 hours.   The patient was located at Home: [address] [place] [address].  The provider was located at Home (Appt Dept State): [place].    Date of Service:  5/28/2024    Chief Complaint:      Chief Complaint   Patient presents with    Weight Management       Assessment/Plan:    Bonny was seen today for weight management.    Diagnoses and all orders for this visit:    Morbid obesity with BMI of 40.0-44.9, adult (HCC)  -     Tirzepatide (MOUNJARO) 7.5 MG/0.5ML SOPN SC injection; Inject 0.5 mLs into the skin once a week    Stable and continue on current medications.    Return VV Weight management 8/15.      HPI:  Bonny Kellogg is a 48 y.o.      Obesity:  lshe's at 271 # and lost a total of 15# on Mounjaro 7.5 mg qweek and not feel as hungry and more energic.  She had some nausea in the begging but not anymore.  She has cut her portion size to half and exercising on her pelaton 2 times a week and walk daily for 30 mins.    Lab Results   Component Value Date    LABA1C 5.3 03/23/2023     Lab Results   Component Value Date     02/28/2024    K 4.6 02/28/2024     02/28/2024    CO2 27 02/28/2024    BUN 10 02/28/2024    CREATININE 0.9 02/28/2024    GLUCOSE 101 (H) 02/28/2024    CALCIUM 10.3 02/28/2024     Lab Results   Component Value Date/Time    CHOL 158 02/28/2024 10:17 AM    TRIG 126 02/28/2024 10:17 AM    HDL 42 02/28/2024 10:17 AM     Lab Results   Component Value Date    ALT 33 02/28/2024    AST 22 02/28/2024     Lab Results   Component Value Date    TSH 1.84

## 2024-08-15 ENCOUNTER — TELEMEDICINE (OUTPATIENT)
Dept: INTERNAL MEDICINE CLINIC | Age: 49
End: 2024-08-15
Payer: COMMERCIAL

## 2024-08-15 VITALS — WEIGHT: 265.6 LBS | BODY MASS INDEX: 44.25 KG/M2 | HEIGHT: 65 IN

## 2024-08-15 DIAGNOSIS — E66.01 MORBID OBESITY WITH BMI OF 40.0-44.9, ADULT (HCC): Primary | ICD-10-CM

## 2024-08-15 PROCEDURE — 99213 OFFICE O/P EST LOW 20 MIN: CPT | Performed by: INTERNAL MEDICINE

## 2024-08-15 PROCEDURE — G8427 DOCREV CUR MEDS BY ELIG CLIN: HCPCS | Performed by: INTERNAL MEDICINE

## 2024-08-15 RX ORDER — TIRZEPATIDE 10 MG/.5ML
10 INJECTION, SOLUTION SUBCUTANEOUS WEEKLY
Qty: 3 ADJUSTABLE DOSE PRE-FILLED PEN SYRINGE | Refills: 2 | Status: SHIPPED | OUTPATIENT
Start: 2024-08-15

## 2024-08-15 NOTE — PROGRESS NOTES
Patient was evaluated through a synchronous (real-time) video encounter. Patient identification was verified at the start of the visit. She (or guardian if applicable) is aware that this is a billable service, which includes applicable co-pays. This visit was conducted with the patient's (and/or legal guardian's) verbal consent. She has not had a related appointment within my department in the past 7 days or scheduled within the next 24 hours.   The patient was located at Home: [address] [place] [address].  The provider was located at Home (Appt Dept State): [place].    Date of Service:  8/15/2024    Chief Complaint:      Chief Complaint   Patient presents with    Weight Management       Assessment/Plan:    Bonny was seen today for weight management.    Diagnoses and all orders for this visit:    Morbid obesity with BMI of 40.0-44.9, adult (HCC)  Increase Tirzepatide (MOUNJARO) 10 MG/0.5ML SOPN SC injection; Inject 0.5 mLs into the skin once a week      Return VV Weight management 10/24.      HPI:  Bonny Kellogg is a 48 y.o.      Obesity:  she's at 265 # and lost a total of 21# on Mounjaro 7.5 mg qweek and not been suppressing her appetite as it used to without side effects.  She has cut her portion size to half and exercising on her pelaton 2 times a week and walk daily for 30 mins.     Lab Results   Component Value Date    LABA1C 5.3 03/23/2023     Lab Results   Component Value Date     02/28/2024    K 4.6 02/28/2024     02/28/2024    CO2 27 02/28/2024    BUN 10 02/28/2024    CREATININE 0.9 02/28/2024    GLUCOSE 101 (H) 02/28/2024    CALCIUM 10.3 02/28/2024     Lab Results   Component Value Date/Time    CHOL 158 02/28/2024 10:17 AM    TRIG 126 02/28/2024 10:17 AM    HDL 42 02/28/2024 10:17 AM     Lab Results   Component Value Date    ALT 33 02/28/2024    AST 22 02/28/2024     Lab Results   Component Value Date    TSH 1.84 03/23/2023     Lab Results   Component Value Date    WBC 9.5

## 2024-10-17 ENCOUNTER — TELEMEDICINE (OUTPATIENT)
Dept: INTERNAL MEDICINE CLINIC | Age: 49
End: 2024-10-17
Payer: COMMERCIAL

## 2024-10-17 VITALS — HEIGHT: 65 IN | WEIGHT: 258 LBS | BODY MASS INDEX: 42.99 KG/M2

## 2024-10-17 DIAGNOSIS — E66.01 MORBID OBESITY WITH BMI OF 40.0-44.9, ADULT: Primary | ICD-10-CM

## 2024-10-17 PROCEDURE — G8427 DOCREV CUR MEDS BY ELIG CLIN: HCPCS | Performed by: INTERNAL MEDICINE

## 2024-10-17 PROCEDURE — 99213 OFFICE O/P EST LOW 20 MIN: CPT | Performed by: INTERNAL MEDICINE

## 2024-10-17 RX ORDER — SEMAGLUTIDE 2.68 MG/ML
2 INJECTION, SOLUTION SUBCUTANEOUS
Qty: 3 ML | Refills: 2 | Status: SHIPPED | OUTPATIENT
Start: 2024-10-17

## 2024-10-17 NOTE — PROGRESS NOTES
Patient was evaluated through a synchronous (real-time) video encounter. Patient identification was verified at the start of the visit. She (or guardian if applicable) is aware that this is a billable service, which includes applicable co-pays. This visit was conducted with the patient's (and/or legal guardian's) verbal consent. She has not had a related appointment within my department in the past 7 days or scheduled within the next 24 hours.   The patient was located at Other: work in [place] .  The provider was located at Home (Appt Dept State): [place].    Date of Service:  10/17/2024    Chief Complaint:      Chief Complaint   Patient presents with    Weight Management       Assessment/Plan:    Bonny was seen today for weight management.    Diagnoses and all orders for this visit:    Morbid obesity with BMI of 40.0-44.9, adult  Change to semaglutide, 2 MG/DOSE, (OZEMPIC, 2 MG/DOSE,) 8 MG/3ML SOPN sc injection; Inject 2 mg into the skin every 7 days      Return PE 1/13.      HPI:  Bonny Kellogg is a 48 y.o.      Obesity:  she's at 258 # and lost a total of 27# (basesline 285) on Mounjaro 10 mg qweek and not been suppressing her appetite as it used to without side effects.  She has cut her portion size to half and exercising on her pelaton 2 times a week and walk daily for 30 mins.     Lab Results   Component Value Date    LABA1C 5.3 03/23/2023     Lab Results   Component Value Date     02/28/2024    K 4.6 02/28/2024     02/28/2024    CO2 27 02/28/2024    BUN 10 02/28/2024    CREATININE 0.9 02/28/2024    GLUCOSE 101 (H) 02/28/2024    CALCIUM 10.3 02/28/2024     Lab Results   Component Value Date/Time    CHOL 158 02/28/2024 10:17 AM    TRIG 126 02/28/2024 10:17 AM    HDL 42 02/28/2024 10:17 AM     Lab Results   Component Value Date    ALT 33 02/28/2024    AST 22 02/28/2024     Lab Results   Component Value Date    TSH 1.84 03/23/2023     Lab Results   Component Value Date    WBC 9.5 02/28/2024    HGB

## 2025-01-13 ENCOUNTER — TELEMEDICINE (OUTPATIENT)
Dept: INTERNAL MEDICINE CLINIC | Age: 50
End: 2025-01-13
Payer: COMMERCIAL

## 2025-01-13 DIAGNOSIS — E66.01 MORBID OBESITY WITH BMI OF 40.0-44.9, ADULT: ICD-10-CM

## 2025-01-13 PROCEDURE — G8427 DOCREV CUR MEDS BY ELIG CLIN: HCPCS | Performed by: INTERNAL MEDICINE

## 2025-01-13 PROCEDURE — 99213 OFFICE O/P EST LOW 20 MIN: CPT | Performed by: INTERNAL MEDICINE

## 2025-01-13 RX ORDER — SEMAGLUTIDE 2.68 MG/ML
2 INJECTION, SOLUTION SUBCUTANEOUS
Qty: 3 ML | Refills: 2 | Status: SHIPPED | OUTPATIENT
Start: 2025-01-13

## 2025-01-13 SDOH — ECONOMIC STABILITY: FOOD INSECURITY: WITHIN THE PAST 12 MONTHS, YOU WORRIED THAT YOUR FOOD WOULD RUN OUT BEFORE YOU GOT MONEY TO BUY MORE.: NEVER TRUE

## 2025-01-13 SDOH — ECONOMIC STABILITY: TRANSPORTATION INSECURITY
IN THE PAST 12 MONTHS, HAS THE LACK OF TRANSPORTATION KEPT YOU FROM MEDICAL APPOINTMENTS OR FROM GETTING MEDICATIONS?: NO

## 2025-01-13 SDOH — ECONOMIC STABILITY: FOOD INSECURITY: WITHIN THE PAST 12 MONTHS, THE FOOD YOU BOUGHT JUST DIDN'T LAST AND YOU DIDN'T HAVE MONEY TO GET MORE.: NEVER TRUE

## 2025-01-13 SDOH — ECONOMIC STABILITY: INCOME INSECURITY: IN THE LAST 12 MONTHS, WAS THERE A TIME WHEN YOU WERE NOT ABLE TO PAY THE MORTGAGE OR RENT ON TIME?: NO

## 2025-01-13 SDOH — ECONOMIC STABILITY: TRANSPORTATION INSECURITY
IN THE PAST 12 MONTHS, HAS LACK OF TRANSPORTATION KEPT YOU FROM MEETINGS, WORK, OR FROM GETTING THINGS NEEDED FOR DAILY LIVING?: NO

## 2025-01-13 NOTE — PROGRESS NOTES
Patient was evaluated through a synchronous (real-time) video encounter. Patient identification was verified at the start of the visit. She (or guardian if applicable) is aware that this is a billable service, which includes applicable co-pays. This visit was conducted with the patient's (and/or legal guardian's) verbal consent. She has not had a related appointment within my department in the past 7 days or scheduled within the next 24 hours.   The patient was located at Other: work in [place] .  The provider was located at Facility (Appt Dept): [address] [place] [address], [address]  [place] [address].    Date of Service:  1/13/2025    Chief Complaint:      Chief Complaint   Patient presents with    Weight Management       Assessment/Plan:    Bonny was seen today for weight management.    Diagnoses and all orders for this visit:    Morbid obesity with BMI of 40.0-44.9, adult  -     semaglutide, 2 MG/DOSE, (OZEMPIC, 2 MG/DOSE,) 8 MG/3ML SOPN sc injection; Inject 2 mg into the skin every 7 days    Will try to switch to Zepbound       Return Physical 2/25.      HPI:  Bonny Kellogg is a 49 y.o.      Obesity:  she's at 257 # and lost a total of 28# (basesline 285) on Ozempic 2 mg qweek and not been suppressing her appetite as much as mounjaro but working without side effects.  She has cut her portion size to half and exercising on her pelaton 2 times a week and walk daily for 30 mins.     Lab Results   Component Value Date    LABA1C 5.3 03/23/2023     Lab Results   Component Value Date     02/28/2024    K 4.6 02/28/2024     02/28/2024    CO2 27 02/28/2024    BUN 10 02/28/2024    CREATININE 0.9 02/28/2024    GLUCOSE 101 (H) 02/28/2024    CALCIUM 10.3 02/28/2024     Lab Results   Component Value Date/Time    CHOL 158 02/28/2024 10:17 AM    TRIG 126 02/28/2024 10:17 AM    HDL 42 02/28/2024 10:17 AM     Lab Results   Component Value Date    ALT 33 02/28/2024    AST 22 02/28/2024     Lab Results   Component

## 2025-01-27 DIAGNOSIS — E66.01 MORBID OBESITY WITH BMI OF 40.0-44.9, ADULT: Primary | ICD-10-CM

## 2025-01-29 ENCOUNTER — TELEPHONE (OUTPATIENT)
Dept: ADMINISTRATIVE | Age: 50
End: 2025-01-29

## 2025-01-29 NOTE — TELEPHONE ENCOUNTER
Submitted PA for Zepbound 7.5MG/0.5ML pen-injectors  Via [facility] [id number] STATUS: PENDING.    Follow up done daily; if no decision with in three days we will refax.  If another three days goes by with no decision will call the insurance for status.

## 2025-01-30 NOTE — TELEPHONE ENCOUNTER
The medication is APPROVED.    Outcome  Approved on January 29 by [facility] 2017  Your PA request has been approved. Additional information will be provided in the approval communication. (Message 1140)  Authorization Expiration Date: 9/28/2025    If this requires a response please respond to the pool ( P [facility] MEDICATION PRE-AUTH).      Thank you please advise patient.

## 2025-02-23 DIAGNOSIS — E66.01 MORBID OBESITY WITH BMI OF 40.0-44.9, ADULT: ICD-10-CM

## 2025-02-24 RX ORDER — TIRZEPATIDE 7.5 MG/.5ML
INJECTION, SOLUTION SUBCUTANEOUS
Qty: 2 ML | Refills: 0 | OUTPATIENT
Start: 2025-02-24

## 2025-02-25 ENCOUNTER — OFFICE VISIT (OUTPATIENT)
Dept: INTERNAL MEDICINE CLINIC | Age: 50
End: 2025-02-25
Payer: COMMERCIAL

## 2025-02-25 VITALS
SYSTOLIC BLOOD PRESSURE: 118 MMHG | WEIGHT: 258 LBS | HEART RATE: 80 BPM | OXYGEN SATURATION: 97 % | BODY MASS INDEX: 41.46 KG/M2 | HEIGHT: 66 IN | DIASTOLIC BLOOD PRESSURE: 62 MMHG

## 2025-02-25 DIAGNOSIS — Z00.00 ENCOUNTER FOR WELL ADULT EXAM WITHOUT ABNORMAL FINDINGS: Primary | ICD-10-CM

## 2025-02-25 DIAGNOSIS — E66.01 MORBID OBESITY WITH BMI OF 40.0-44.9, ADULT: ICD-10-CM

## 2025-02-25 PROCEDURE — 99396 PREV VISIT EST AGE 40-64: CPT | Performed by: INTERNAL MEDICINE

## 2025-02-25 ASSESSMENT — PATIENT HEALTH QUESTIONNAIRE - PHQ9
SUM OF ALL RESPONSES TO PHQ QUESTIONS 1-9: 0
2. FEELING DOWN, DEPRESSED OR HOPELESS: NOT AT ALL
SUM OF ALL RESPONSES TO PHQ9 QUESTIONS 1 & 2: 0
SUM OF ALL RESPONSES TO PHQ QUESTIONS 1-9: 0
1. LITTLE INTEREST OR PLEASURE IN DOING THINGS: NOT AT ALL

## 2025-02-25 NOTE — PROGRESS NOTES
[facility]  History and Physical  Cece Adame M.D.        Bonny Kellogg  YOB: 1975    Date of Service:  2/25/2025    Chief Complaint:   Bonny Kellogg is a 49 y.o. female who presents for   Chief Complaint   Patient presents with    Annual Exam     No new issues or concerns        Assessment/Plan:    Bonny was seen today for annual exam.    Diagnoses and all orders for this visit:    Encounter for well adult exam without abnormal findings  -     Lipid Panel  -     Comprehensive Metabolic Panel  -     CBC with Auto Differential    Morbid obesity with BMI of 40.0-44.9, adult  -     tirzepatide-weight management (ZEPBOUND) 7.5 MG/0.5ML SOAJ subCUTAneous auto-injector pen; Inject 7.5 mg into the skin every 7 days    Return VV Weight Management 3/27.      HPI: Here for Annual Physical and Follow up.    She's also having some irregular periods and not have them as often but it's heavy when she does have it for 2 weeks.  She will be seeing her Gynecologist.    Obesity:  she lost a total of 32# currently at 253# (basesline 285) on zepbound 7.5 mg qweek and is suppressing her appetite without side effects.  She has cut her portion size to half and exercising on her pelaton 2 times a week and walk daily for 30 mins.     Lab Results   Component Value Date    LABA1C 5.3 03/23/2023     Lab Results   Component Value Date     02/28/2024    K 4.6 02/28/2024     02/28/2024    CO2 27 02/28/2024    BUN 10 02/28/2024    CREATININE 0.9 02/28/2024    GLUCOSE 101 (H) 02/28/2024    CALCIUM 10.3 02/28/2024     Lab Results   Component Value Date/Time    CHOL 158 02/28/2024 10:17 AM    TRIG 126 02/28/2024 10:17 AM    HDL 42 02/28/2024 10:17 AM     Lab Results   Component Value Date    ALT 33 02/28/2024    AST 22 02/28/2024     Lab Results   Component Value Date    TSH 1.84 03/23/2023     Lab Results   Component Value Date    WBC 9.5 02/28/2024    HGB 14.5 02/28/2024    HCT 44.5 02/28/2024

## 2025-02-26 LAB
ALBUMIN SERPL-MCNC: 4.8 G/DL (ref 3.4–5)
ALBUMIN/GLOB SERPL: 1.7 {RATIO} (ref 1.1–2.2)
ALP SERPL-CCNC: 85 U/L (ref 40–129)
ALT SERPL-CCNC: 32 U/L (ref 10–40)
ANION GAP SERPL CALCULATED.3IONS-SCNC: 12 MMOL/L (ref 3–16)
AST SERPL-CCNC: 24 U/L (ref 15–37)
BASOPHILS # BLD: 0.1 K/UL (ref 0–0.2)
BASOPHILS NFR BLD: 0.8 %
BILIRUB SERPL-MCNC: 0.4 MG/DL (ref 0–1)
BUN SERPL-MCNC: 10 MG/DL (ref 7–20)
CALCIUM SERPL-MCNC: 10.4 MG/DL (ref 8.3–10.6)
CHLORIDE SERPL-SCNC: 101 MMOL/L (ref 99–110)
CHOLEST SERPL-MCNC: 166 MG/DL (ref 0–199)
CO2 SERPL-SCNC: 27 MMOL/L (ref 21–32)
CREAT SERPL-MCNC: 0.7 MG/DL (ref 0.6–1.1)
DEPRECATED RDW RBC AUTO: 14 % (ref 12.4–15.4)
EOSINOPHIL # BLD: 0.4 K/UL (ref 0–0.6)
EOSINOPHIL NFR BLD: 4.4 %
GFR SERPLBLD CREATININE-BSD FMLA CKD-EPI: >90 ML/MIN/{1.73_M2}
GLUCOSE SERPL-MCNC: 78 MG/DL (ref 70–99)
HCT VFR BLD AUTO: 45.4 % (ref 36–48)
HDLC SERPL-MCNC: 41 MG/DL (ref 40–60)
HGB BLD-MCNC: 15.1 G/DL (ref 12–16)
LDLC SERPL CALC-MCNC: 98 MG/DL
LYMPHOCYTES # BLD: 2.5 K/UL (ref 1–5.1)
LYMPHOCYTES NFR BLD: 27.4 %
MCH RBC QN AUTO: 29.7 PG (ref 26–34)
MCHC RBC AUTO-ENTMCNC: 33.2 G/DL (ref 31–36)
MCV RBC AUTO: 89.5 FL (ref 80–100)
MONOCYTES # BLD: 0.4 K/UL (ref 0–1.3)
MONOCYTES NFR BLD: 4.8 %
NEUTROPHILS # BLD: 5.6 K/UL (ref 1.7–7.7)
NEUTROPHILS NFR BLD: 62.6 %
PLATELET # BLD AUTO: 396 K/UL (ref 135–450)
PMV BLD AUTO: 8.3 FL (ref 5–10.5)
POTASSIUM SERPL-SCNC: 4.6 MMOL/L (ref 3.5–5.1)
PROT SERPL-MCNC: 7.6 G/DL (ref 6.4–8.2)
RBC # BLD AUTO: 5.07 M/UL (ref 4–5.2)
SODIUM SERPL-SCNC: 140 MMOL/L (ref 136–145)
TRIGL SERPL-MCNC: 136 MG/DL (ref 0–150)
VLDLC SERPL CALC-MCNC: 27 MG/DL
WBC # BLD AUTO: 9 K/UL (ref 4–11)

## 2025-03-13 ENCOUNTER — HOSPITAL ENCOUNTER (OUTPATIENT)
Dept: MAMMOGRAPHY | Age: 50
Discharge: HOME | End: 2025-03-13
Payer: COMMERCIAL

## 2025-03-13 ENCOUNTER — RESULTS FOLLOW-UP (OUTPATIENT)
Dept: MAMMOGRAPHY | Age: 50
End: 2025-03-13

## 2025-03-13 VITALS — WEIGHT: 220 LBS | BODY MASS INDEX: 36.65 KG/M2 | HEIGHT: 65 IN

## 2025-03-13 DIAGNOSIS — Z12.31 SCREENING MAMMOGRAM, ENCOUNTER FOR: ICD-10-CM

## 2025-03-13 PROCEDURE — 77063 BREAST TOMOSYNTHESIS BI: CPT

## 2025-03-27 ENCOUNTER — TELEMEDICINE (OUTPATIENT)
Dept: INTERNAL MEDICINE CLINIC | Age: 50
End: 2025-03-27
Payer: COMMERCIAL

## 2025-03-27 VITALS — BODY MASS INDEX: 41.65 KG/M2 | HEIGHT: 65 IN | WEIGHT: 250 LBS

## 2025-03-27 DIAGNOSIS — E66.01 MORBID OBESITY WITH BMI OF 40.0-44.9, ADULT: ICD-10-CM

## 2025-03-27 PROCEDURE — G8427 DOCREV CUR MEDS BY ELIG CLIN: HCPCS | Performed by: INTERNAL MEDICINE

## 2025-03-27 PROCEDURE — 99213 OFFICE O/P EST LOW 20 MIN: CPT | Performed by: INTERNAL MEDICINE

## 2025-03-27 NOTE — PROGRESS NOTES
Patient was evaluated through a synchronous (real-time) video encounter. Patient identification was verified at the start of the visit. She (or guardian if applicable) is aware that this is a billable service, which includes applicable co-pays. This visit was conducted with the patient's (and/or legal guardian's) verbal consent. She has not had a related appointment within my department in the past 7 days or scheduled within the next 24 hours.   The patient was located at Home: [address] [place] [address].  The provider was located at Home (Appt Dept State): [place].    Date of Service:  3/27/2025    Chief Complaint:      Chief Complaint   Patient presents with    Weight Management       Assessment/Plan:    Bonny was seen today for weight management.    Diagnoses and all orders for this visit:    Morbid obesity with BMI of 40.0-44.9, adult  Increase tirzepatide-weight management (ZEPBOUND) 10 MG/0.5ML SOAJ subCUTAneous auto-injector pen; Inject 10 mg into the skin every 7 days    Stable and continue on current medications.    Return Establish with new PCP.      HPI:  Bonny Kellogg is a 49 y.o.      Obesity:  she lost a total of 35# currently at 250# (basesline 285) on zepbound 7.5 mg qweek but it has not suppress her appetite as much this past week.  No side effects.  She has cut her portion size to half and exercising on her pelaton 2 times a week and walk daily for 30 mins.     Lab Results   Component Value Date    LABA1C 5.3 03/23/2023     Lab Results   Component Value Date     02/25/2025    K 4.6 02/25/2025     02/25/2025    CO2 27 02/25/2025    BUN 10 02/25/2025    CREATININE 0.7 02/25/2025    GLUCOSE 78 02/25/2025    CALCIUM 10.4 02/25/2025     Lab Results   Component Value Date/Time    CHOL 166 02/25/2025 11:43 AM    TRIG 136 02/25/2025 11:43 AM    HDL 41 02/25/2025 11:43 AM     Lab Results   Component Value Date    ALT 32 02/25/2025    AST 24 02/25/2025     Lab Results   Component